# Patient Record
Sex: MALE | Race: WHITE | NOT HISPANIC OR LATINO | Employment: FULL TIME | URBAN - METROPOLITAN AREA
[De-identification: names, ages, dates, MRNs, and addresses within clinical notes are randomized per-mention and may not be internally consistent; named-entity substitution may affect disease eponyms.]

---

## 2022-04-10 ENCOUNTER — HISTORICAL (OUTPATIENT)
Dept: ADMINISTRATIVE | Facility: HOSPITAL | Age: 42
End: 2022-04-10

## 2022-04-29 VITALS
DIASTOLIC BLOOD PRESSURE: 94 MMHG | BODY MASS INDEX: 21.77 KG/M2 | OXYGEN SATURATION: 99 % | WEIGHT: 164.25 LBS | SYSTOLIC BLOOD PRESSURE: 142 MMHG | HEIGHT: 73 IN

## 2023-05-24 ENCOUNTER — HOSPITAL ENCOUNTER (EMERGENCY)
Facility: HOSPITAL | Age: 43
Discharge: PSYCHIATRIC OR SUBSTANCE USE FACILITY | End: 2023-05-24
Attending: EMERGENCY MEDICINE
Payer: COMMERCIAL

## 2023-05-24 VITALS
BODY MASS INDEX: 23.19 KG/M2 | HEART RATE: 85 BPM | WEIGHT: 175 LBS | OXYGEN SATURATION: 97 % | RESPIRATION RATE: 20 BRPM | DIASTOLIC BLOOD PRESSURE: 83 MMHG | HEIGHT: 73 IN | SYSTOLIC BLOOD PRESSURE: 150 MMHG | TEMPERATURE: 99 F

## 2023-05-24 DIAGNOSIS — F10.920 ALCOHOLIC INTOXICATION WITHOUT COMPLICATION: ICD-10-CM

## 2023-05-24 DIAGNOSIS — F32.A DEPRESSION WITH SUICIDAL IDEATION: Primary | ICD-10-CM

## 2023-05-24 DIAGNOSIS — R45.851 DEPRESSION WITH SUICIDAL IDEATION: Primary | ICD-10-CM

## 2023-05-24 DIAGNOSIS — F29 PSYCHOSIS, UNSPECIFIED PSYCHOSIS TYPE: ICD-10-CM

## 2023-05-24 LAB
ALBUMIN SERPL-MCNC: 4.5 G/DL (ref 3.5–5)
ALBUMIN/GLOB SERPL: 1.7 RATIO (ref 1.1–2)
ALP SERPL-CCNC: 76 UNIT/L (ref 40–150)
ALT SERPL-CCNC: 21 UNIT/L (ref 0–55)
AMPHET UR QL SCN: POSITIVE
APAP SERPL-MCNC: <17.4 UG/ML (ref 17.4–30)
APPEARANCE UR: CLEAR
AST SERPL-CCNC: 23 UNIT/L (ref 5–34)
BACTERIA #/AREA URNS AUTO: NORMAL /HPF
BARBITURATE SCN PRESENT UR: NEGATIVE
BASOPHILS # BLD AUTO: 0.03 X10(3)/MCL
BASOPHILS NFR BLD AUTO: 0.4 %
BENZODIAZ UR QL SCN: NEGATIVE
BILIRUB UR QL STRIP.AUTO: NEGATIVE MG/DL
BILIRUBIN DIRECT+TOT PNL SERPL-MCNC: 0.4 MG/DL
BUN SERPL-MCNC: 9.1 MG/DL (ref 8.9–20.6)
CALCIUM SERPL-MCNC: 9 MG/DL (ref 8.4–10.2)
CANNABINOIDS UR QL SCN: NEGATIVE
CHLORIDE SERPL-SCNC: 107 MMOL/L (ref 98–107)
CO2 SERPL-SCNC: 24 MMOL/L (ref 22–29)
COCAINE UR QL SCN: NEGATIVE
COLOR UR: YELLOW
CREAT SERPL-MCNC: 1.13 MG/DL (ref 0.73–1.18)
EOSINOPHIL # BLD AUTO: 0.06 X10(3)/MCL (ref 0–0.9)
EOSINOPHIL NFR BLD AUTO: 0.7 %
ERYTHROCYTE [DISTWIDTH] IN BLOOD BY AUTOMATED COUNT: 11.9 % (ref 11.5–17)
ETHANOL SERPL-MCNC: 188 MG/DL
ETHANOL SERPL-MCNC: 57 MG/DL
FENTANYL UR QL SCN: NEGATIVE
GFR SERPLBLD CREATININE-BSD FMLA CKD-EPI: >60 MLS/MIN/1.73/M2
GLOBULIN SER-MCNC: 2.7 GM/DL (ref 2.4–3.5)
GLUCOSE SERPL-MCNC: 99 MG/DL (ref 74–100)
GLUCOSE UR QL STRIP.AUTO: NEGATIVE MG/DL
HCT VFR BLD AUTO: 43.6 % (ref 42–52)
HGB BLD-MCNC: 15.5 G/DL (ref 14–18)
IMM GRANULOCYTES # BLD AUTO: 0.01 X10(3)/MCL (ref 0–0.04)
IMM GRANULOCYTES NFR BLD AUTO: 0.1 %
KETONES UR QL STRIP.AUTO: ABNORMAL MG/DL
LEUKOCYTE ESTERASE UR QL STRIP.AUTO: NEGATIVE UNIT/L
LYMPHOCYTES # BLD AUTO: 2.26 X10(3)/MCL (ref 0.6–4.6)
LYMPHOCYTES NFR BLD AUTO: 27.4 %
MCH RBC QN AUTO: 33.1 PG (ref 27–31)
MCHC RBC AUTO-ENTMCNC: 35.6 G/DL (ref 33–36)
MCV RBC AUTO: 93.2 FL (ref 80–94)
MDMA UR QL SCN: NEGATIVE
MONOCYTES # BLD AUTO: 0.57 X10(3)/MCL (ref 0.1–1.3)
MONOCYTES NFR BLD AUTO: 6.9 %
NEUTROPHILS # BLD AUTO: 5.31 X10(3)/MCL (ref 2.1–9.2)
NEUTROPHILS NFR BLD AUTO: 64.5 %
NITRITE UR QL STRIP.AUTO: NEGATIVE
NRBC BLD AUTO-RTO: 0 %
OPIATES UR QL SCN: NEGATIVE
PCP UR QL: NEGATIVE
PH UR STRIP.AUTO: 6 [PH]
PH UR: 6 [PH] (ref 3–11)
PLATELET # BLD AUTO: 321 X10(3)/MCL (ref 130–400)
PMV BLD AUTO: 9.8 FL (ref 7.4–10.4)
POTASSIUM SERPL-SCNC: 3.5 MMOL/L (ref 3.5–5.1)
PROT SERPL-MCNC: 7.2 GM/DL (ref 6.4–8.3)
PROT UR QL STRIP.AUTO: ABNORMAL MG/DL
RBC # BLD AUTO: 4.68 X10(6)/MCL (ref 4.7–6.1)
RBC #/AREA URNS AUTO: <5 /HPF
RBC UR QL AUTO: NEGATIVE UNIT/L
SARS-COV-2 RDRP RESP QL NAA+PROBE: NEGATIVE
SODIUM SERPL-SCNC: 143 MMOL/L (ref 136–145)
SP GR UR STRIP.AUTO: 1.02 (ref 1–1.03)
SPECIFIC GRAVITY, URINE AUTO (.000) (OHS): 1.02 (ref 1–1.03)
SQUAMOUS #/AREA URNS AUTO: <5 /HPF
TSH SERPL-ACNC: 1.59 UIU/ML (ref 0.35–4.94)
UROBILINOGEN UR STRIP-ACNC: 1 MG/DL
WBC # SPEC AUTO: 8.24 X10(3)/MCL (ref 4.5–11.5)
WBC #/AREA URNS AUTO: <5 /HPF

## 2023-05-24 PROCEDURE — 80307 DRUG TEST PRSMV CHEM ANLYZR: CPT | Performed by: EMERGENCY MEDICINE

## 2023-05-24 PROCEDURE — 81001 URINALYSIS AUTO W/SCOPE: CPT | Mod: 59 | Performed by: EMERGENCY MEDICINE

## 2023-05-24 PROCEDURE — 80143 DRUG ASSAY ACETAMINOPHEN: CPT | Performed by: EMERGENCY MEDICINE

## 2023-05-24 PROCEDURE — 25000003 PHARM REV CODE 250: Performed by: EMERGENCY MEDICINE

## 2023-05-24 PROCEDURE — 85025 COMPLETE CBC W/AUTO DIFF WBC: CPT | Performed by: EMERGENCY MEDICINE

## 2023-05-24 PROCEDURE — 87635 SARS-COV-2 COVID-19 AMP PRB: CPT | Performed by: EMERGENCY MEDICINE

## 2023-05-24 PROCEDURE — 84443 ASSAY THYROID STIM HORMONE: CPT | Performed by: EMERGENCY MEDICINE

## 2023-05-24 PROCEDURE — 80053 COMPREHEN METABOLIC PANEL: CPT | Performed by: EMERGENCY MEDICINE

## 2023-05-24 PROCEDURE — 63600175 PHARM REV CODE 636 W HCPCS: Performed by: EMERGENCY MEDICINE

## 2023-05-24 PROCEDURE — 82077 ASSAY SPEC XCP UR&BREATH IA: CPT | Performed by: EMERGENCY MEDICINE

## 2023-05-24 PROCEDURE — 96372 THER/PROPH/DIAG INJ SC/IM: CPT | Performed by: EMERGENCY MEDICINE

## 2023-05-24 PROCEDURE — 99285 EMERGENCY DEPT VISIT HI MDM: CPT

## 2023-05-24 RX ORDER — DIPHENHYDRAMINE HYDROCHLORIDE 50 MG/ML
50 INJECTION INTRAMUSCULAR; INTRAVENOUS EVERY 4 HOURS PRN
Status: DISCONTINUED | OUTPATIENT
Start: 2023-05-24 | End: 2023-05-25 | Stop reason: HOSPADM

## 2023-05-24 RX ORDER — LORAZEPAM 1 MG/1
2 TABLET ORAL EVERY 8 HOURS PRN
Status: DISCONTINUED | OUTPATIENT
Start: 2023-05-24 | End: 2023-05-25 | Stop reason: HOSPADM

## 2023-05-24 RX ORDER — IBUPROFEN 200 MG
1 TABLET ORAL DAILY
Status: DISCONTINUED | OUTPATIENT
Start: 2023-05-24 | End: 2023-05-25 | Stop reason: HOSPADM

## 2023-05-24 RX ORDER — HALOPERIDOL 5 MG/ML
5 INJECTION INTRAMUSCULAR EVERY 4 HOURS PRN
Status: DISCONTINUED | OUTPATIENT
Start: 2023-05-24 | End: 2023-05-25 | Stop reason: HOSPADM

## 2023-05-24 NOTE — ED PROVIDER NOTES
"Encounter Date: 5/24/2023    SCRIBE #1 NOTE: I, Ivone Szymanski, am scribing for, and in the presence of,  Esteban Madrigal MD. I have scribed the following portions of the note - Other sections scribed: HPI, ROS, PE.     History     Chief Complaint   Patient presents with    Psychiatric Evaluation     Pt. Arrives via PD s/t OPC. Pt. Refusing to answer questions. OPC states pt. "Threatened to blow his brains out." +etoh.      43 year old male w/ hx of HTN presents to ED via police under OPC by fiance for psychiatric evaluation. Per OPC, pt was threatening to blow his brains out and has been hearing voices talking about his father passing away. Pt denies any suicidal ideations or auditory hallucinations, states that his fiancee and him were arguing and that's while she filed the OPC. 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 He denies any drug use.     He is PECed at 1215.     The history is provided by the patient and the spouse. No  was used.   Review of patient's allergies indicates:  No Known Allergies  History reviewed. No pertinent past medical history.  History reviewed. No pertinent surgical history.  History reviewed. No pertinent family history.     Review of Systems   Constitutional:  Negative for fever.   Respiratory:  Negative for shortness of breath.    Cardiovascular:  Negative for chest pain.   Psychiatric/Behavioral:  Negative for hallucinations and suicidal ideas.      Physical Exam     Initial Vitals [05/24/23 1204]   BP Pulse Resp Temp SpO2   (!) 135/100 (!) 120 20 98.4 °F (36.9 °C) 98 %      MAP       --         Physical Exam    Nursing note and vitals reviewed.  Constitutional: He appears well-developed and well-nourished.   HENT:   Head: Normocephalic and atraumatic.   Mouth/Throat: Oropharynx is clear and " moist.   Eyes: Pupils are equal, round, and reactive to light.   Neck: Neck supple.   Normal range of motion.  Cardiovascular:  Normal rate, regular rhythm, normal heart sounds and intact distal pulses.           Pulmonary/Chest: Breath sounds normal. No respiratory distress.   Abdominal: Abdomen is soft. Bowel sounds are normal. There is no abdominal tenderness. There is no rebound and no guarding.   Musculoskeletal:         General: No tenderness or edema. Normal range of motion.      Cervical back: Normal range of motion and neck supple.     Neurological: He is alert and oriented to person, place, and time. He has normal strength. No sensory deficit. GCS score is 15. GCS eye subscore is 4. GCS verbal subscore is 5. GCS motor subscore is 6.   Skin: Skin is warm and dry. Capillary refill takes less than 2 seconds.   Psychiatric:   Pt appears irritated, tense.        ED Course   Procedures  Labs Reviewed   URINALYSIS, REFLEX TO URINE CULTURE - Abnormal; Notable for the following components:       Result Value    Protein, UA 1+ (*)     Ketones, UA Trace (*)     All other components within normal limits   DRUG SCREEN, URINE (BEAKER) - Abnormal; Notable for the following components:    Amphetamines, Urine Positive (*)     All other components within normal limits    Narrative:     Cut off concentrations:    Amphetamines - 1000 ng/ml  Barbiturates - 200 ng/ml  Benzodiazepine - 200 ng/ml  Cannabinoids (THC) - 50 ng/ml  Cocaine - 300 ng/ml  Fentanyl - 1.0 ng/ml  MDMA - 500 ng/ml  Opiates - 300 ng/ml   Phencyclidine (PCP) - 25 ng/ml    Specimen submitted for drug analysis and tested for pH and specific gravity in order to evaluate sample integrity. Suspect tampering if specific gravity is <1.003 and/or pH is not within the range of 4.5 - 8.0  False negatives may result form substances such as bleach added to urine.  False positives may result for the presence of a substance with similar chemical structure to the drug or  its metabolite.    This test provides only a PRELIMINARY analytical test result. A more specific alternate chemical method must be used in order to obtain a confirmed analytical result. Gas chromatography/mass spectrometry (GC/MS) is the preferred confirmatory method. Other chemical confirmation methods are available. Clinical consideration and professional judgement should be applied to any drug of abuse test result, particularly when preliminary positive results are used.    Positive results will be confirmed only at the physicians request. Unconfirmed screening results are to be used only for medical purposes (treatment).        ALCOHOL,MEDICAL (ETHANOL) - Abnormal; Notable for the following components:    Ethanol Level 188.0 (*)     All other components within normal limits   ACETAMINOPHEN LEVEL - Abnormal; Notable for the following components:    Acetaminophen Level <17.4 (*)     All other components within normal limits   CBC WITH DIFFERENTIAL - Abnormal; Notable for the following components:    RBC 4.68 (*)     MCH 33.1 (*)     All other components within normal limits   TSH - Normal   SARS-COV-2 RNA AMPLIFICATION, QUAL - Normal    Narrative:     The IDNOW COVID-19 assay is a rapid molecular in vitro diagnostic test utilizing an isothermal nucleic acid amplification technology intended for the qualitative detection of nucleic acid from the SARS-CoV-2 viral RNA in direct nasal, nasopharyngeal or throat swabs from individuals who are suspected of COVID-19 by their healthcare provider.   URINALYSIS, MICROSCOPIC - Normal   CBC W/ AUTO DIFFERENTIAL    Narrative:     The following orders were created for panel order CBC auto differential.  Procedure                               Abnormality         Status                     ---------                               -----------         ------                     CBC with Differential[[id number]]        Abnormal            Final result                 Please view  results for these tests on the individual orders.   COMPREHENSIVE METABOLIC PANEL   SARS CORONAVIRUS 2 ANTIGEN POCT, MANUAL READ          Imaging Results    None          Medications   LORazepam tablet 2 mg ( Oral Canceled Entry 5/24/23 1300)   haloperidol lactate injection 5 mg (5 mg Intramuscular Not Given 5/24/23 1300)   diphenhydrAMINE injection 50 mg ( Intramuscular Canceled Entry 5/24/23 1421)   nicotine 21 mg/24 hr 1 patch (has no administration in time range)     Medical Decision Making  Differential diagnosis include, but are not limited to: suicidal ideation, depression, schizoaffective disorder, bipolar disorder, substance abuse, alcohol abuse, psychosis.     Amount and/or Complexity of Data Reviewed  Independent Historian: spouse     Details: Per OPC, pt was threatening to blow his brains out and has been hearing voices talking about his father passing away.  Labs: ordered.     Details: Urinalysis positive for amphetamines   Alcohol 188    Risk  Decision regarding hospitalization.  Diagnosis or treatment significantly limited by social determinants of health.  Risk Details: Patient with mental health along with alcohol use issues  Patient will be transferred for evaluation by mental health professional for possible new onset hallucinations along with depression and suicidal ideations                 Scribe Attestation:   Scribe #1: I performed the above scribed service and the documentation accurately describes the services I performed. I attest to the accuracy of the note.    Attending Attestation:           Physician Attestation for Scribe:  Physician Attestation Statement for Scribe #1: I, Esteban Madrigal MD, reviewed documentation, as scribed by Ivone Szymanski in my presence, and it is both accurate and complete.           ED Course as of 05/24/23 1520   Wed May 24, 2023   1518 Patient with elevated alcohol level, will repeat at 6:00 p.m. [MP]      ED Course User Index  [MP] Esteban Madrigal MD        Medically cleared for psychiatry placement: 5/24/2023  3:19 PM         Clinical Impression:   Final diagnoses:  [F32.A, R45.851] Depression with suicidal ideation (Primary)  [F29] Psychosis, unspecified psychosis type  [F10.920] Alcoholic intoxication without complication        ED Disposition Condition    Transfer to Psych Facility Stable          ED Prescriptions    None       Follow-up Information    None          Esteban Madrigal MD  05/24/23 3731

## 2023-05-25 ENCOUNTER — HOSPITAL ENCOUNTER (INPATIENT)
Facility: HOSPITAL | Age: 43
LOS: 6 days | Discharge: HOME | DRG: 885 | End: 2023-05-31
Attending: PSYCHIATRY & NEUROLOGY | Admitting: PSYCHIATRY & NEUROLOGY
Payer: COMMERCIAL

## 2023-05-25 DIAGNOSIS — F33.3 MDD (MAJOR DEPRESSIVE DISORDER), RECURRENT, SEVERE, WITH PSYCHOSIS: ICD-10-CM

## 2023-05-25 PROBLEM — Z72.0 TOBACCO ABUSE: Status: ACTIVE | Noted: 2023-05-25

## 2023-05-25 LAB
CHOLEST SERPL-MCNC: 228 MG/DL
CHOLEST/HDLC SERPL: 4 {RATIO} (ref 0–5)
GLUCOSE P FAST SERPL-MCNC: 93 MG/DL (ref 74–100)
HDLC SERPL-MCNC: 63 MG/DL (ref 35–60)
LDLC SERPL CALC-MCNC: 147 MG/DL (ref 50–140)
T PALLIDUM AB SER QL: NONREACTIVE
TRIGL SERPL-MCNC: 91 MG/DL (ref 34–140)
VLDLC SERPL CALC-MCNC: 18 MG/DL

## 2023-05-25 PROCEDURE — S4991 NICOTINE PATCH NONLEGEND: HCPCS | Performed by: PSYCHIATRY & NEUROLOGY

## 2023-05-25 PROCEDURE — 86780 TREPONEMA PALLIDUM: CPT | Performed by: PSYCHIATRY & NEUROLOGY

## 2023-05-25 PROCEDURE — 25000003 PHARM REV CODE 250: Performed by: PSYCHIATRY & NEUROLOGY

## 2023-05-25 PROCEDURE — 82947 ASSAY GLUCOSE BLOOD QUANT: CPT | Performed by: PSYCHIATRY & NEUROLOGY

## 2023-05-25 PROCEDURE — 80061 LIPID PANEL: CPT | Performed by: PSYCHIATRY & NEUROLOGY

## 2023-05-25 PROCEDURE — 11400000 HC PSYCH PRIVATE ROOM

## 2023-05-25 RX ORDER — ACETAMINOPHEN 325 MG/1
650 TABLET ORAL EVERY 6 HOURS PRN
Status: DISCONTINUED | OUTPATIENT
Start: 2023-05-25 | End: 2023-05-31 | Stop reason: HOSPADM

## 2023-05-25 RX ORDER — HYDROXYZINE PAMOATE 25 MG/1
50 CAPSULE ORAL EVERY 6 HOURS PRN
Status: DISCONTINUED | OUTPATIENT
Start: 2023-05-25 | End: 2023-05-31 | Stop reason: HOSPADM

## 2023-05-25 RX ORDER — MAG HYDROX/ALUMINUM HYD/SIMETH 200-200-20
30 SUSPENSION, ORAL (FINAL DOSE FORM) ORAL EVERY 6 HOURS PRN
Status: DISCONTINUED | OUTPATIENT
Start: 2023-05-25 | End: 2023-05-31 | Stop reason: HOSPADM

## 2023-05-25 RX ORDER — DIPHENHYDRAMINE HCL 25 MG
25 CAPSULE ORAL EVERY 6 HOURS PRN
Status: DISCONTINUED | OUTPATIENT
Start: 2023-05-25 | End: 2023-05-31 | Stop reason: HOSPADM

## 2023-05-25 RX ORDER — IBUPROFEN 200 MG
1 TABLET ORAL DAILY
Status: DISCONTINUED | OUTPATIENT
Start: 2023-05-25 | End: 2023-05-31 | Stop reason: HOSPADM

## 2023-05-25 RX ORDER — MUPIROCIN 20 MG/G
OINTMENT TOPICAL 2 TIMES DAILY
Status: DISPENSED | OUTPATIENT
Start: 2023-05-25 | End: 2023-05-30

## 2023-05-25 RX ORDER — IBUPROFEN 400 MG/1
400 TABLET ORAL EVERY 6 HOURS PRN
Status: DISCONTINUED | OUTPATIENT
Start: 2023-05-25 | End: 2023-05-31 | Stop reason: HOSPADM

## 2023-05-25 RX ADMIN — DIPHENHYDRAMINE HYDROCHLORIDE 25 MG: 25 CAPSULE ORAL at 10:05

## 2023-05-25 RX ADMIN — NICOTINE 1 PATCH: 21 PATCH, EXTENDED RELEASE TRANSDERMAL at 08:05

## 2023-05-25 RX ADMIN — MUPIROCIN: 20 OINTMENT TOPICAL at 11:05

## 2023-05-25 RX ADMIN — MUPIROCIN: 20 OINTMENT TOPICAL at 09:05

## 2023-05-25 RX ADMIN — DIPHENHYDRAMINE HYDROCHLORIDE 25 MG: 25 CAPSULE ORAL at 08:05

## 2023-05-25 RX ADMIN — HYDROXYZINE PAMOATE 50 MG: 25 CAPSULE ORAL at 08:05

## 2023-05-25 NOTE — PROGRESS NOTES
Recreation Therapy Progress Note    Date: 5 25 2023     Time: 10:00 am     Group Title: creative expression     Mood: anxious and focused on DC     Behavior: cooperative and attended group with prompts and motivation     Affect: flat and low energy     Speech: normal    Cognition: alert but focused on leaving the hospital     Participation Level:  60% with prompts and motivation needed.     Intervention:cont to offer rt services.           Recreation Therapist   Signature: Abhishek HANEYS

## 2023-05-25 NOTE — ASSESSMENT & PLAN NOTE
Advise to stop smoking.  Can offer nicotine patch.  He is not interested in stopping(cessation=4mins)
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
## 2023-05-25 NOTE — HPI
42 yo male admitted to [facility] for suicidal ideation and auditory hallucinations.  He has no previous medical history.  He does drink Etoh and smokes tobacco.  He was threatening to blow his brains out.  Currently no N/V/D/C.  No fever/chills.  No chest pain/SOB.  Hospitalist have been consulted for medical evaluation.

## 2023-05-25 NOTE — PROGRESS NOTES
Recreation Therapy Assessment    1. MOOD: depressed  and anxious    2. BEHAVIOR:cooperative    3. AFFECT:anxious and focused  on being DC today he states.     4. COGNITION: alert    5. MOTOR BEHAVIOR/SKILLS: ambulatory    6. SPEECH:normal     7. LEISURE FUNCTIONING: music,fishing,sports,cooking , play pool  and visit with family    8. LEISURE NEEDS: to get back having fun with the guys...listen to music and exercise     9. 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 WHAT IS THE BEST THING THAT EVER HAPPENED TO YOU? My girlfriend    11. THINGS THAT FRUSTRATE AND ANGER ME ARE: nothing    12. WHEN I GET ANGRY, I USUALLY:  I yell pt states    13. MY RELATIONSHIP WITH MOST PEOPLE ARE: good    14. LEISURE INTERESTS/SKILLS: sports,music,exercise    15. LEISURE BARRIER:I be drinking     16. ASSESSMENT SUMMARY: pt is a  43 year old white male . Pt states he was drinking  and the girlfriend called the police and had him admitted. Pt states he is fine and ready to go home. Pt has 1 daughter that he does not get to see in another state. Pt states he has a good job. And lives with his girlfriend.  Pt drinks 6 pack a day he states.       17. TX PLAN FOR RECREATION THERAPY: pt will receive RT services 2 x a day and 5 x a week with Abhishek Ryan MA CTRS . Pt will be assisted to complete 3 to 4 assignments on non substance related leisure skills, interests and participation  to  substance abuse and enhance quality of life.  Pt will be assisted to complete 3 to 4 assignments  on problem solving skills and emotional outlets to enhance coping skills in daily living. Pt will utilize art,music,cognitive games and exercise to achieve these goals. Assist pt 1;1 as needed to achieve these goals.       18. SIGNATURE/CREDENTIALS:  Abhishek Ryan MA CTRS                                                                    DATE:  2023                             TIME:7;23 am         RECREATION THERAPIST  [facility]

## 2023-05-25 NOTE — GROUP NOTE
Group Psychotherapy       Group Focus: Communication Skills      Number of patients in attendance: 5    Group Start Time: 1730  Group End Time:  1815  Groups Date: 5/25/2023  Group Topic:  Behavioral Health  Group Department: [facility]  Group Facilitators:  Meseret Baker LPN  _____________________________________________________________________    Patient Name: Raymond Quinn  MRN: [id number]  Patient Class: IP- Psych   Admission Date\Time: 5/25/2023 12:05 AM  Hospital Length of Stay: 0  Primary Care Provider: Primary Doctor No     Referred by: Behavioral Medicine Unit Treatment Team     Target symptoms: Depression     Patient's response to treatment: Active Listening     Progress toward goals: Progressing slowly     Interval History: Participated with minimal interaction     Diagnosis: MDD     Plan: Continue treatment on BMU

## 2023-05-25 NOTE — PROGRESS NOTES
Recreation Therapy Progress Note    Date: 5 25 2023     Time: 1400    Group Title: leisure skills    Mood: anxious     Behavior: cooperative     Affect: less anxious this afternoon    Speech: normal    Cognition: alert but distracted with DC on today he states.     Participation Level: 50% in outdoor activity . Pt requested music activity.     Intervention:cont rt services          Recreation Therapist   Signature: Abhishek king MA CTRS

## 2023-05-25 NOTE — PLAN OF CARE
Problem: Adult Inpatient Plan of Care  Goal: Plan of Care Review  Outcome: Ongoing, Progressing  Goal: Patient-Specific Goal (Individualized)  Outcome: Ongoing, Progressing  Goal: Absence of Hospital-Acquired Illness or Injury  Outcome: Ongoing, Progressing  Goal: Optimal Comfort and Wellbeing  Outcome: Ongoing, Progressing  Goal: Readiness for Transition of Care  Outcome: Ongoing, Progressing     Problem: Behavior Regulation Impairment (Psychotic Signs/Symptoms)  Goal: Improved Behavioral Control (Psychotic Signs/Symptoms)  Outcome: Ongoing, Progressing     Problem: Cognitive Impairment (Psychotic Signs/Symptoms)  Goal: Optimal Cognitive Function (Psychotic Signs/Symptoms)  Outcome: Ongoing, Progressing     Problem: Decreased Participation and Engagement (Psychotic Signs/Symptoms)  Goal: Increased Participation and Engagement (Psychotic Signs/Symptoms)  Outcome: Ongoing, Progressing     Problem: Mood Impairment (Psychotic Signs/Symptoms)  Goal: Improved Mood Symptoms (Psychotic Signs/Symptoms)  Outcome: Ongoing, Progressing     Problem: Psychomotor Impairment (Psychotic Signs/Symptoms)  Goal: Improved Psychomotor Symptoms (Psychotic Signs/Symptoms)  Outcome: Ongoing, Progressing     Problem: Sensory Perception Impairment (Psychotic Signs/Symptoms)  Goal: Decreased Sensory Symptoms (Psychotic Signs/Symptoms)  Outcome: Ongoing, Progressing     Problem: Sleep Disturbance (Psychotic Signs/Symptoms)  Goal: Improved Sleep (Psychotic Signs/Symptoms)  Outcome: Ongoing, Progressing     Problem: Social, Occupational or Functional Impairment (Psychotic Signs/Symptoms)  Goal: Enhanced Social, Occupational or Functional Skills (Psychotic Signs/Symptoms)  Outcome: Ongoing, Progressing     Problem: Activity and Energy Impairment (Excessive Substance Use)  Goal: Optimized Energy Level (Excessive Substance Use)  Outcome: Ongoing, Progressing     Problem: Behavior Regulation Impairment (Excessive Substance Use)  Goal: Improved  Behavioral Control (Excessive Substance Use)  Outcome: Ongoing, Progressing     Problem: Decreased Participation and Engagement (Excessive Substance Use)  Goal: Increased Participation and Engagement (Excessive Substance Use)  Outcome: Ongoing, Progressing     Problem: Physiologic Impairment (Excessive Substance Use)  Goal: Improved Physiologic Symptoms (Excessive Substance Use)  Outcome: Ongoing, Progressing     Problem: Social, Occupational or Functional Impairment (Excessive Substance Use)  Goal: Enhanced Social, Occupational or Functional Skills (Excessive Substance Use)  Outcome: Ongoing, Progressing     Problem: Suicide Risk  Goal: Absence of Self-Harm  Outcome: Ongoing, Progressing     Problem: Adult Behavioral Health Plan of Care  Goal: Plan of Care Review  Outcome: Ongoing, Progressing  Goal: Patient-Specific Goal (Individualization)  Outcome: Ongoing, Progressing  Goal: Adheres to Safety Considerations for Self and Others  Outcome: Ongoing, Progressing  Goal: Absence of New-Onset Illness or Injury  Outcome: Ongoing, Progressing  Goal: Optimized Coping Skills in Response to Life Stressors  Outcome: Ongoing, Progressing  Goal: Develops/Participates in Therapeutic Geraldine to Support Successful Transition  Outcome: Ongoing, Progressing  Goal: Rounds/Family Conference  Outcome: Ongoing, Progressing     Problem: Depression  Goal: Improved Mood  Outcome: Ongoing, Progressing

## 2023-05-25 NOTE — SUBJECTIVE & OBJECTIVE
No past medical history on file.    No past surgical history on file.    Review of patient's allergies indicates:  No Known Allergies    Current Facility-Administered Medications on File Prior to Encounter   Medication    [DISCONTINUED] diphenhydrAMINE injection 50 mg    [DISCONTINUED] haloperidol lactate injection 5 mg    [DISCONTINUED] LORazepam tablet 2 mg    [DISCONTINUED] nicotine 21 mg/24 hr 1 patch     No current outpatient medications on file prior to encounter.     Family History    None       Tobacco Use    Smoking status: Not on file    Smokeless tobacco: Not on file   Substance and Sexual Activity    Alcohol use: Not on file    Drug use: Not on file    Sexual activity: Not on file     Review of Systems   Constitutional: Negative.    HENT: Negative.     Eyes: Negative.    Respiratory: Negative.     Cardiovascular: Negative.    Gastrointestinal: Negative.    Endocrine: Negative.    Genitourinary: Negative.    Musculoskeletal: Negative.    Skin: Negative.    Allergic/Immunologic: Negative.    Neurological: Negative.    Hematological: Negative.    Psychiatric/Behavioral:  Positive for suicidal ideas.    Objective:     Vital Signs (Most Recent):  Temp: 98.5 °F (36.9 °C) (05/25/23 0604)  Pulse: 89 (05/25/23 0604)  Resp: 18 (05/25/23 0604)  BP: 127/79 (05/25/23 0604)  SpO2: 97 % (05/25/23 0604) Vital Signs (24h Range):  Temp:  [97.7 °F (36.5 °C)-98.8 °F (37.1 °C)] 98.5 °F (36.9 °C)  Pulse:  [] 89  Resp:  [16-20] 18  SpO2:  [97 %-99 %] 97 %  BP: (123-150)/() 127/79     Weight: 74.8 kg (164 lb 14.5 oz)  Body mass index is 22.37 kg/m².     Physical Exam  Constitutional:       Appearance: Normal appearance. He is normal weight.   HENT:      Head: Normocephalic and atraumatic.      Nose: Nose normal.      Mouth/Throat:      Mouth: Mucous membranes are moist.      Pharynx: Oropharynx is clear.   Eyes:      Extraocular Movements: Extraocular movements intact and EOM normal.      Conjunctiva/sclera:  Conjunctivae normal.      Pupils: Pupils are equal, round, and reactive to light.   Cardiovascular:      Rate and Rhythm: Normal rate and regular rhythm.      Pulses: Normal pulses.      Heart sounds: Normal heart sounds.   Pulmonary:      Effort: Pulmonary effort is normal.      Breath sounds: Normal breath sounds.   Abdominal:      General: Bowel sounds are normal.      Palpations: Abdomen is soft.   Musculoskeletal:         General: Normal range of motion.      Cervical back: Normal range of motion and neck supple.   Skin:     General: Skin is warm and dry.      Capillary Refill: Capillary refill takes 2 to 3 seconds.   Neurological:      General: No focal deficit present.      Mental Status: He is alert and oriented to person, place, and time. Mental status is at baseline.   Psychiatric:         Mood and Affect: Mood normal.         Behavior: Behavior normal.         Thought Content: Thought content normal.         Judgment: Judgment normal.       CRANIAL NERVES     CN I  cranial nerve I not tested    CN II   Visual fields full to confrontation.     CN III, IV, VI   Pupils are equal, round, and reactive to light.  Extraocular motions are normal.   CN III: no CN III palsy  CN VI: no CN VI palsy    CN V   Facial sensation intact.     CN VII   Facial expression full, symmetric.     CN VIII   CN VIII normal.     CN IX, X   CN IX normal.   CN X normal.     CN XI   CN XI normal.     CN XII   CN XII normal.       Significant Labs: All pertinent labs within the past 24 hours have been reviewed.  BMP:   Recent Labs   Lab 05/24/23  1224      K 3.5   CO2 24   BUN 9.1   CREATININE 1.13   CALCIUM 9.0     CBC:   Recent Labs   Lab 05/24/23  1224   WBC 8.24   HGB 15.5   HCT 43.6        CMP:   Recent Labs   Lab 05/24/23  1224      K 3.5   CO2 24   BUN 9.1   CREATININE 1.13   CALCIUM 9.0   ALBUMIN 4.5   BILITOT 0.4   ALKPHOS 76   AST 23   ALT 21     Magnesium: No results for input(s): MG in the last 48  hours.    Significant Imaging: I have reviewed all pertinent imaging results/findings within the past 24 hours.

## 2023-05-25 NOTE — ED NOTES
"Called [facility] to give report; nurse stated "[facility] didn't send over the packet yet, we will call you back"   "

## 2023-05-25 NOTE — NURSING
Pt given Benadyl 25mg PO for c/o itching. Pt currently reporting feeling much better after taking this medication.

## 2023-05-25 NOTE — ASSESSMENT & PLAN NOTE
Patient has single episode of depression which is moderate and is currently uncontrolled. Will Begin anti-depressant medications. We will consult psychiatry at this time. Patient does not display psychosis at this time. Continue to monitor closely and adjust plan of care as needed.    Admit to [facility]  OOB  Ambulate  Review current meds

## 2023-05-25 NOTE — CONSULTS
[facility] Hospital Medicine  Consult Note    Patient Name: Raymond Quinn  MRN: [id number]  Admission Date: 2023  Hospital Length of Stay: 0 days  Attending Physician: José Bethea MD   Primary Care Provider: Primary Doctor No           Patient information was obtained from patient and ER records.     Consults  Subjective:     Principal Problem: MDD (major depressive disorder), recurrent, severe, with psychosis    Chief Complaint: No chief complaint on file.       HPI: 42 yo male admitted to [facility] for suicidal ideation and auditory hallucinations.  He has no previous medical history.  He does drink Etoh and smokes tobacco.  He was threatening to blow his brains out.  Currently no N/V/D/C.  No fever/chills.  No chest pain/SOB.  Hospitalist have been consulted for medical evaluation.      No past medical history on file.    Past surgical history on file:  2 inguinal hernias, hand surery    Review of patient's allergies indicates:  No Known Allergies    Current Facility-Administered Medications on File Prior to Encounter   Medication    [DISCONTINUED] diphenhydrAMINE injection 50 mg    [DISCONTINUED] haloperidol lactate injection 5 mg    [DISCONTINUED] LORazepam tablet 2 mg    [DISCONTINUED] nicotine 21 mg/24 hr 1 patch     No current outpatient medications on file prior to encounter.     Family History    Father  of CVA at 68 yo       Tobacco Use    Smoking status: Vaping, cigarettes    Smokeless tobacco: Not on file   Substance and Sexual Activity    Alcohol use: 6 beers daily    Drug use: Not on file    Sexual activity: Not on file     Review of Systems   Constitutional: Negative.    HENT: Negative.     Eyes: Negative.    Respiratory: Negative.     Cardiovascular: Negative.    Gastrointestinal: Negative.    Endocrine: Negative.    Genitourinary: Negative.    Musculoskeletal: Negative.    Skin: Negative.    Allergic/Immunologic: Negative.    Neurological:  Negative.    Hematological: Negative.    Psychiatric/Behavioral:  Positive for suicidal ideas.    Objective:     Vital Signs (Most Recent):  Temp: 98.5 °F (36.9 °C) (05/25/23 0604)  Pulse: 89 (05/25/23 0604)  Resp: 18 (05/25/23 0604)  BP: 127/79 (05/25/23 0604)  SpO2: 97 % (05/25/23 0604) Vital Signs (24h Range):  Temp:  [97.7 °F (36.5 °C)-98.8 °F (37.1 °C)] 98.5 °F (36.9 °C)  Pulse:  [] 89  Resp:  [16-20] 18  SpO2:  [97 %-99 %] 97 %  BP: (123-150)/() 127/79     Weight: 74.8 kg (164 lb 14.5 oz)  Body mass index is 22.37 kg/m².     Physical Exam  Constitutional:       Appearance: Normal appearance. He is normal weight.   HENT:      Head: Normocephalic and atraumatic.      Nose: Nose normal.      Mouth/Throat:      Mouth: Mucous membranes are moist.      Pharynx: Oropharynx is clear.   Eyes:      Extraocular Movements: Extraocular movements intact and EOM normal.      Conjunctiva/sclera: Conjunctivae normal.      Pupils: Pupils are equal, round, and reactive to light.   Cardiovascular:      Rate and Rhythm: Normal rate and regular rhythm.      Pulses: Normal pulses.      Heart sounds: Normal heart sounds.   Pulmonary:      Effort: Pulmonary effort is normal.      Breath sounds: Normal breath sounds.   Abdominal:      General: Bowel sounds are normal.      Palpations: Abdomen is soft.   Musculoskeletal:         General: Normal range of motion.      Cervical back: Normal range of motion and neck supple.   Skin:     General: Skin is warm and dry.      Capillary Refill: Capillary refill takes 2 to 3 seconds.   Neurological:      General: No focal deficit present.      Mental Status: He is alert and oriented to person, place, and time. Mental status is at baseline.   Psychiatric:         Mood and Affect: Mood normal.         Behavior: Behavior normal.         Thought Content: Thought content normal.         Judgment: Judgment normal.       CRANIAL NERVES     CN I  cranial nerve I not tested    CN II   Visual  fields full to confrontation.     CN III, IV, VI   Pupils are equal, round, and reactive to light.  Extraocular motions are normal.   CN III: no CN III palsy  CN VI: no CN VI palsy    CN V   Facial sensation intact.     CN VII   Facial expression full, symmetric.     CN VIII   CN VIII normal.     CN IX, X   CN IX normal.   CN X normal.     CN XI   CN XI normal.     CN XII   CN XII normal.       Significant Labs: All pertinent labs within the past 24 hours have been reviewed.  BMP:   Recent Labs   Lab 05/24/23  1224      K 3.5   CO2 24   BUN 9.1   CREATININE 1.13   CALCIUM 9.0     CBC:   Recent Labs   Lab 05/24/23  1224   WBC 8.24   HGB 15.5   HCT 43.6        CMP:   Recent Labs   Lab 05/24/23  1224      K 3.5   CO2 24   BUN 9.1   CREATININE 1.13   CALCIUM 9.0   ALBUMIN 4.5   BILITOT 0.4   ALKPHOS 76   AST 23   ALT 21     Magnesium: No results for input(s): MG in the last 48 hours.    Significant Imaging: I have reviewed all pertinent imaging results/findings within the past 24 hours.    Assessment/Plan:     * MDD (major depressive disorder), recurrent, severe, with psychosis  Patient has single episode of depression which is moderate and is currently uncontrolled. Will Begin anti-depressant medications. We will consult psychiatry at this time. Patient does not display psychosis at this time. Continue to monitor closely and adjust plan of care as needed.    Admit to [facility]  OOB  Ambulate  Review current meds    Tobacco abuse  Advise to stop smoking.  Can offer nicotine patch.  He is not interested in stopping(cessation=4mins)        VTE Risk Mitigation (From admission, onward)    None              Thank you for your consult. I will sign off. Please contact us if you have any additional questions.    Narendra Rodriguez MD  Department of Hospital Medicine   [facility]

## 2023-05-25 NOTE — NURSING
"                                                                             Admission Note:     Raymond Quinn is a 43 y.o. male, : 1980, MRN: [id number], admitted on 2023 to [facility] ([facility]) for José Bethea MD with a diagnosis of Major Depressive Disorder recurrent severe with psychotic features . Patient admitted on a status of Physician Emergency Certificate (PEC). Raymond reports the following allergies: NKDA.     Patient demonstrated an affect that was flat.  Patient demonstrated mood during assessment that was calm and cooperative. Patient had an appearance that was well kept and clean. When asked why he presented to the ER, pt stated, "I don't know, my girlfriend brought me there. I'm ready to go home." His father recently passed away in 2023. He lives with girlfriend Chichi.      Patient denies having suicidal ideation. Patient denies having auditory or visual hallucinations. Pt states, " I have been to [place] in [place] a few years ago."      Raymond's height is 6 ft and weight is 74.8 kg. His temperature is 97.7°F (36.5 C). His blood pressure is 123/85 and his pulse is 78. His respirations are 16 and oxygen saturation is 97%.      Raymond's  last BM was noted on: 2023. Raymond presented to unit flat mood AAO X4, steady gait, and her speech was clear and fluent. Pt is well kept. Pt states he smokes 1 pack a day and drinks a 6 pack of beer per day, but denies any drug use (UDS positive for amphetamines).  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       Metal detector screening performed via security personnel. The result of the scan was negative. Head-to-toe physical assessment completed with the following findings: tattoos noted on right chest, (2) on left forearm, left upper arm, right upper arm, right lower back, left shoulder blade, left side of rib cage, right forearm.  A full skin assessment was performed. Tamar skin appeared dry. He has a rash on his " face that is dry, red, flaky, and he states it itches.   .   Raymond was oriented to unit, staff, peers, and room. Patient belongings/valuables stored in locked intake room cabinet and changes of clothing provided to patient. Tamar was placed on Q 15 min observations. Q15min safety checks
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
## 2023-05-25 NOTE — NURSING
"Daily Nursing Note:      Behavior:    Patient (Raymond Quinn is a 43 y.o. male, : 1980, MRN: [id number]) demonstrating an affect that was flat and anxious. Raymond demonstrating mood that is anxious. Raymond had an appearance that was clean. Raymond denies suicidal ideation. Raymond denies suicide plan. Raymond denies homicidal ideation. Raymond denies hallucinations. "I had a misunderstanding with my girlfriend". "I found out she works for the [employer]" Denies paranoia. Admits to using a small amount of meth a few days ago. Denies past SA, history of depression  was on antidepressant 15 years ago, "I didn't like the way it made me feel". Refusing to take medications.    Raymond's  height is 6' (1.829 m) and weight is 74.8 kg (164 lb 14.5 oz). His oral temperature is 98.4 °F (36.9 °C). His blood pressure is 139/88 and his pulse is 83. His respiration is 18 and oxygen saturation is 98%.       Intervention:    Encourage Raymond to perform self-hygiene, grooming, and changing of clothing. Monitor Raymond's behavior and program compliance. Monitor Raymond for suicidal ideation, homicidal ideation, sleep disturbance, and hallucinations. Encourage Raymond to eat all portions of meals and assess for meal preferences. Monitor Raymond for intake and output to ensure hydration. Notify the Physician/Physician Assistant/Advance Practice Registered Nurse (MD/PA/APRN) for any medication refusal and any change in patient condition.      Response:    Raymond verbalizes understand of unit process and procedures. Raymond is refusing medications.      Plan:     Continue to monitor per MD/PA/APRN orders; maintain patient safety.   "

## 2023-05-26 PROBLEM — F15.150: Status: ACTIVE | Noted: 2023-05-26

## 2023-05-26 PROCEDURE — S4991 NICOTINE PATCH NONLEGEND: HCPCS | Performed by: PSYCHIATRY & NEUROLOGY

## 2023-05-26 PROCEDURE — 25000003 PHARM REV CODE 250: Performed by: PSYCHIATRY & NEUROLOGY

## 2023-05-26 PROCEDURE — 11400000 HC PSYCH PRIVATE ROOM

## 2023-05-26 RX ADMIN — DIPHENHYDRAMINE HYDROCHLORIDE 25 MG: 25 CAPSULE ORAL at 08:05

## 2023-05-26 RX ADMIN — MUPIROCIN: 20 OINTMENT TOPICAL at 08:05

## 2023-05-26 RX ADMIN — HYDROXYZINE PAMOATE 50 MG: 25 CAPSULE ORAL at 08:05

## 2023-05-26 RX ADMIN — NICOTINE 1 PATCH: 21 PATCH, EXTENDED RELEASE TRANSDERMAL at 08:05

## 2023-05-26 NOTE — NURSING
"Administered Benadryl 25mg po per pt c/o "face itching"  Will monitor for effectiveness.    Q 15 min safety checks in progress.    "
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
## 2023-05-26 NOTE — PLAN OF CARE
Problem: Adult Inpatient Plan of Care  Goal: Plan of Care Review  Outcome: Ongoing, Progressing  Goal: Patient-Specific Goal (Individualized)  Outcome: Ongoing, Progressing  Goal: Absence of Hospital-Acquired Illness or Injury  Outcome: Ongoing, Progressing  Goal: Optimal Comfort and Wellbeing  Outcome: Ongoing, Progressing  Goal: Readiness for Transition of Care  Outcome: Ongoing, Progressing     Problem: Behavior Regulation Impairment (Psychotic Signs/Symptoms)  Goal: Improved Behavioral Control (Psychotic Signs/Symptoms)  Outcome: Ongoing, Progressing     Problem: Cognitive Impairment (Psychotic Signs/Symptoms)  Goal: Optimal Cognitive Function (Psychotic Signs/Symptoms)  Outcome: Ongoing, Progressing     Problem: Decreased Participation and Engagement (Psychotic Signs/Symptoms)  Goal: Increased Participation and Engagement (Psychotic Signs/Symptoms)  Outcome: Ongoing, Progressing     Problem: Mood Impairment (Psychotic Signs/Symptoms)  Goal: Improved Mood Symptoms (Psychotic Signs/Symptoms)  Outcome: Ongoing, Progressing     Problem: Psychomotor Impairment (Psychotic Signs/Symptoms)  Goal: Improved Psychomotor Symptoms (Psychotic Signs/Symptoms)  Outcome: Ongoing, Progressing     Problem: Sensory Perception Impairment (Psychotic Signs/Symptoms)  Goal: Decreased Sensory Symptoms (Psychotic Signs/Symptoms)  Outcome: Ongoing, Progressing     Problem: Sleep Disturbance (Psychotic Signs/Symptoms)  Goal: Improved Sleep (Psychotic Signs/Symptoms)  Outcome: Ongoing, Progressing     Problem: Social, Occupational or Functional Impairment (Psychotic Signs/Symptoms)  Goal: Enhanced Social, Occupational or Functional Skills (Psychotic Signs/Symptoms)  Outcome: Ongoing, Progressing     Problem: Activity and Energy Impairment (Excessive Substance Use)  Goal: Optimized Energy Level (Excessive Substance Use)  Outcome: Ongoing, Progressing     Problem: Behavior Regulation Impairment (Excessive Substance Use)  Goal: Improved  Behavioral Control (Excessive Substance Use)  Outcome: Ongoing, Progressing     Problem: Decreased Participation and Engagement (Excessive Substance Use)  Goal: Increased Participation and Engagement (Excessive Substance Use)  Outcome: Ongoing, Progressing     Problem: Physiologic Impairment (Excessive Substance Use)  Goal: Improved Physiologic Symptoms (Excessive Substance Use)  Outcome: Ongoing, Progressing     Problem: Social, Occupational or Functional Impairment (Excessive Substance Use)  Goal: Enhanced Social, Occupational or Functional Skills (Excessive Substance Use)  Outcome: Ongoing, Progressing     Problem: Suicide Risk  Goal: Absence of Self-Harm  Outcome: Ongoing, Progressing     Problem: Adult Behavioral Health Plan of Care  Goal: Plan of Care Review  Outcome: Ongoing, Progressing  Goal: Patient-Specific Goal (Individualization)  Outcome: Ongoing, Progressing  Goal: Adheres to Safety Considerations for Self and Others  Outcome: Ongoing, Progressing  Goal: Absence of New-Onset Illness or Injury  Outcome: Ongoing, Progressing  Goal: Optimized Coping Skills in Response to Life Stressors  Outcome: Ongoing, Progressing  Goal: Develops/Participates in Therapeutic [place] to Support Successful Transition  Outcome: Ongoing, Progressing  Goal: Rounds/Family Conference  Outcome: Ongoing, Progressing     Problem: Depression  Goal: Improved Mood  Outcome: Ongoing, Progressing     Problem: Violence Risk or Actual  Goal: Anger and Impulse Control  Outcome: Ongoing, Progressing

## 2023-05-26 NOTE — PROGRESS NOTES
"2023 10:37 AM   Name: Raymond Quinn   : 1980   MRN: [id number]   [facility] Progress Note     SUBJECTIVE:   Pt seen and chart reviewed, received update from staff. Pt is new to me, received clinical update from staff and reviewed notes by psych APRN. Briefly, pt admitted on PEC for SI and psychotic sx in the setting of etoh and methamphetamine abuse.     Per staff, pt called his girlfriend yesterday telling her he was being discharged and to come pick him up. Girlfriend proceeded to call staff here crying, saying pt is not well to be released and she is scared of him.     Pt guarded and superficial for interview, affect is anxious and somewhat irritable. Pt opens the interview by stating the NP told him he would be discharged yesterday or today, which is almost certainly an attempt at manipulation - no suggestion that this was the case per APRN's note or per staff's reports. Pt says there is absolutely nothing wrong with him and he needs to return to work. I re-visited the comments he made about his girlfriend being in the [employer], he replies "I would like to keep that confidential." Goes on to say that he knows she is in the [employer] for 100% fact because "the way her dad reacted when I told him." States he has "dotted the I's and crossed the t's" on this theory so it is essentially a fact in his mind. Reality testing very poor. Continues to minimize meth abuse issues, states only using infrequently thought suspect problem is more severe (collateral from girlfriend suggests this as well). He claims he was never suicidal and never wanted to hurt himself, voiced those threats as a means to manipulate his girlfriend in some way. He denies any AVH, but certainly would not disclose these even if they were present. He was offered a trial of medication for what I believe to be psychotic sx (likely meth induced), but he declines because he does not believe he has any issues currently.      Current Medications:   Scheduled " Meds:    mupirocin   Nasal BID    nicotine  1 patch Transdermal Daily      PRN Meds: acetaminophen, aluminum-magnesium hydroxide-simethicone, diphenhydrAMINE, hydrOXYzine pamoate, ibuprofen     Allergies:   Review of patient's allergies indicates:  No Known Allergies     OBJECTIVE:   Vitals   Vitals:    05/26/23 0608   BP: 126/85   Pulse: 84   Resp: 16   Temp: 97.8 °F (36.6 °C)        Mental Status Exam:  Appearance: appropriate, fair hygiene/grooming, casually dressed, and tattoos  Sensorium: alert  Orientation: oriented to person, place, and time  Behavior/Cooperation: guarded and suspicious  Movements/Motor Activity: No tremor or involuntary movements observed. No psychomotor retardation or agitation  Speech: normal tone, normal rate, normal pitch, normal volume  Affect: anxious, irritable  Mood: anxious, irritable  Thought Process:  fairly linear  Associations: no loosening of associations  Thought Content: denies SI/HI/plan/intent and +paranoia as noted in HPI  Thought Perceptions: denies AVH  Attention/Concentration: Normal  Memory: recent and remote grossly intact  Insight: poor  Judgment: poor    Recent Results (from the past 48 hour(s))   Comprehensive metabolic panel    Collection Time: 05/24/23 12:24 PM   Result Value Ref Range    Sodium Level 143 136 - 145 mmol/L    Potassium Level 3.5 3.5 - 5.1 mmol/L    Chloride 107 98 - 107 mmol/L    Carbon Dioxide 24 22 - 29 mmol/L    Glucose Level 99 74 - 100 mg/dL    Blood Urea Nitrogen 9.1 8.9 - 20.6 mg/dL    Creatinine 1.13 0.73 - 1.18 mg/dL    Calcium Level Total 9.0 8.4 - 10.2 mg/dL    Protein Total 7.2 6.4 - 8.3 gm/dL    Albumin Level 4.5 3.5 - 5.0 g/dL    Globulin 2.7 2.4 - 3.5 gm/dL    Albumin/Globulin Ratio 1.7 1.1 - 2.0 ratio    Bilirubin Total 0.4 <=1.5 mg/dL    Alkaline Phosphatase 76 40 - 150 unit/L    Alanine Aminotransferase 21 0 - 55 unit/L    Aspartate Aminotransferase 23 5 - 34 unit/L    eGFR >60 mls/min/1.73/m2   TSH    Collection Time: 05/24/23  12:24 PM   Result Value Ref Range    Thyroid Stimulating Hormone 1.591 0.350 - 4.940 uIU/mL   Ethanol    Collection Time: 05/24/23 12:24 PM   Result Value Ref Range    Ethanol Level 188.0 (H) <=10.0 mg/dL   Acetaminophen level    Collection Time: 05/24/23 12:24 PM   Result Value Ref Range    Acetaminophen Level <17.4 (L) 17.4 - 30.0 ug/ml   CBC with Differential    Collection Time: 05/24/23 12:24 PM   Result Value Ref Range    WBC 8.24 4.50 - 11.50 x10(3)/mcL    RBC 4.68 (L) 4.70 - 6.10 x10(6)/mcL    Hgb 15.5 14.0 - 18.0 g/dL    Hct 43.6 42.0 - 52.0 %    MCV 93.2 80.0 - 94.0 fL    MCH 33.1 (H) 27.0 - 31.0 pg    MCHC 35.6 33.0 - 36.0 g/dL    RDW 11.9 11.5 - 17.0 %    Platelet 321 130 - 400 x10(3)/mcL    MPV 9.8 7.4 - 10.4 fL    Neut % 64.5 %    Lymph % 27.4 %    Mono % 6.9 %    Eos % 0.7 %    Basophil % 0.4 %    Lymph # 2.26 0.6 - 4.6 x10(3)/mcL    Neut # 5.31 2.1 - 9.2 x10(3)/mcL    Mono # 0.57 0.1 - 1.3 x10(3)/mcL    Eos # 0.06 0 - 0.9 x10(3)/mcL    Baso # 0.03 <=0.2 x10(3)/mcL    IG# 0.01 0 - 0.04 x10(3)/mcL    IG% 0.1 %    NRBC% 0.0 %   Urinalysis, Reflex to Urine Culture    Collection Time: 05/24/23 12:45 PM    Specimen: Urine   Result Value Ref Range    Color, UA Yellow Yellow, Light-Yellow, Dark Yellow, Natalia, Straw    Appearance, UA Clear Clear    Specific Gravity, UA 1.020 1.005 - 1.030    pH, UA 6.0 5.0 - 8.5    Protein, UA 1+ (A) Negative mg/dL    Glucose, UA Negative Negative, Normal mg/dL    Ketones, UA Trace (A) Negative mg/dL    Blood, UA Negative Negative unit/L    Bilirubin, UA Negative Negative mg/dL    Urobilinogen, UA 1.0 0.2, 1.0, Normal mg/dL    Nitrites, UA Negative Negative    Leukocyte Esterase, UA Negative Negative unit/L   Drug Screen, Urine    Collection Time: 05/24/23 12:45 PM   Result Value Ref Range    Amphetamines, Urine Positive (A) Negative    Barbituates, Urine Negative Negative    Benzodiazepine, Urine Negative Negative    Cannabinoids, Urine Negative Negative    Cocaine, Urine  Negative Negative    Fentanyl, Urine Negative Negative    MDMA, Urine Negative Negative    Opiates, Urine Negative Negative    Phencyclidine, Urine Negative Negative    pH, Urine 6.0 3.0 - 11.0    Specific Gravity, Urine Auto 1.020 1.001 - 1.035   Urinalysis, Microscopic    Collection Time: 05/24/23 12:45 PM   Result Value Ref Range    RBC, UA <5 <=5 /HPF    WBC, UA <5 <=5 /HPF    Squamous Epithelial Cells, UA <5 <=5 /HPF    Bacteria, UA None Seen None Seen, Rare, Occasional /HPF   COVID-19 Rapid Screening    Collection Time: 05/24/23 12:46 PM   Result Value Ref Range    SARS COV-2 MOLECULAR Negative Negative   Ethanol    Collection Time: 05/24/23  6:22 PM   Result Value Ref Range    Ethanol Level 57.0 (H) <=10.0 mg/dL   Lipid panel    Collection Time: 05/25/23  4:17 AM   Result Value Ref Range    Cholesterol Total 228 (H) <=200 mg/dL    HDL Cholesterol 63 (H) 35 - 60 mg/dL    Triglyceride 91 34 - 140 mg/dL    Cholesterol/HDL Ratio 4 0 - 5    Very Low Density Lipoprotein 18     LDL Cholesterol 147.00 (H) 50.00 - 140.00 mg/dL   SYPHILIS ANTIBODY (WITH REFLEX RPR)    Collection Time: 05/25/23  4:17 AM   Result Value Ref Range    Syphilis Antibody Nonreactive Nonreactive, Equivocal   Glucose, Fasting    Collection Time: 05/25/23  4:17 AM   Result Value Ref Range    Glucose Fasting 93 74 - 100 mg/dL      No results found for: PHENYTOIN, PHENOBARB, VALPROATE, CBMZ      ASSESSMENT/PLAN:   Diagnosis:  Active Hospital Problems    Diagnosis  POA    *MDD (major depressive disorder), recurrent, severe, with psychosis [F33.3]  Unknown    Oth stimulant abuse w stim-induce psych disorder w delusions [F15.150]  Unknown    Tobacco abuse [Z72.0]  Yes      Resolved Hospital Problems   No resolved problems to display.       Plan:  - Pt continues to refuse any/all medications at this time  - Antisocial traits evident, likely contributing to his behavioral issues at home especially when combined with meth +/- etoh  - Will CTM in  controlled environment, continue with education and recommendation to trial medication    Expected Disposition Plan: Home      Billy Johnson MD  Psychiatry - [facility]  05/26/2023 10:56 AM

## 2023-05-26 NOTE — HOSPITAL COURSE
"The patient was admitted to the psychiatric unit and monitored for safety. The medications were reconciled. A history and physical was completed by the primary care doctor and medical issues were addressed. The patient was provided with individual and group therapy.     5/26/23-Pt guarded and superficial for interview, affect is anxious and somewhat irritable. Pt opens the interview by stating the NP told him he would be discharged yesterday or today, which is almost certainly an attempt at manipulation - no suggestion that this was the case per APRN's note or per staff's reports. Pt says there is absolutely nothing wrong with him and he needs to return to work. I re-visited the comments he made about his girlfriend being in the [employer], he replies "I would like to keep that confidential." Goes on to say that he knows she is in the [employer] for 100% fact because "the way her dad reacted when I told him." States he has "dotted the I's and crossed the t's" on this theory so it is essentially a fact in his mind. Reality testing very poor. Continues to minimize meth abuse issues, states only using infrequently thought suspect problem is more severe (collateral from girlfriend suggests this as well). He claims he was never suicidal and never wanted to hurt himself, voiced those threats as a means to manipulate his girlfriend in some way. He denies any AVH, but certainly would not disclose these even if they were present. He was offered a trial of medication for what I believe to be psychotic sx (likely meth induced), but he declines because he does not believe he has any issues currently.     5/28/23-Admitted for depression, SI, delusions. Sleeping and eating well. He has been refusing meds but did take Vistaril and Benadryl. Says the detergent causes a rash. He is stressed due to death of his father recently. "I found out some things about my finicke." He thinks she works for the [employer] but he still talks to her. He lives alone but " lives close to her. Denies si/hi today. Denies hallucinations.  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    5/30/23-Seen for treatment team today. He still only takes Vistaril and Benadryl. He has fixed delusions of girlfriend being in the [employer] and has paranoia but says he is not too worried about it now. He has been calm here. No agitation. No si/hi. No active hallucinations. Sleeping well. He is cooperative in interview overall. Sleeping well. He is likely at baseline and will plan for discharge tomorrow given he is refusing psych meds.

## 2023-05-26 NOTE — NURSING
"Daily Nursing Note:        Behavior:     Patient (Raymond Quinn is a 43 y.o. male, : 1980, MRN: [id number]) demonstrating an affect that was flat and anxious. Raymond demonstrating mood that is anxious. Raymond had an appearance that was clean. Raymond denies suicidal ideation. Raymond denies suicide plan. Raymond denies homicidal ideation. Raymond denies hallucinations. He called [facility] and stated, "You can come pick me up. I can go home."     Raymond's  height is 6' (1.829 m) and weight is 74.8 kg (164 lb 14.5 oz). His oral temperature is 98.4 °F (36.9 °C). His blood pressure is 139/88 and his pulse is 83. His respiration is 18 and oxygen saturation is 98%.         Intervention:     Encourage Raymond to perform self-hygiene, grooming, and changing of clothing. Monitor Raymond's behavior and program compliance. Monitor Raymond for suicidal ideation, homicidal ideation, sleep disturbance, and hallucinations. Encourage Raymond to eat all portions of meals and assess for meal preferences. Monitor Raymond for intake and output to ensure hydration. Notify the Physician/Physician Assistant/Advance Practice Registered Nurse (MD/PA/APRN) for any medication refusal and any change in patient condition.        Response:     Raymond verbalizes understand of unit process and procedures. Raymond is requesting A&D ointment for the rash on his face. He stated the benadryl is helping the rash.        Plan:      Continue to monitor per MD/PA/APRN orders; maintain patient safety.         "

## 2023-05-26 NOTE — NURSING
Pt requesting somethng for itchy face and sleep. Vistaril 50 mg given and benadryl 25 mg given po.

## 2023-05-26 NOTE — GROUP NOTE
Group Psychotherapy       Group Focus: Stress Management      Number of patients in attendance: 3    Group Start Time: 1500  Group End Time:  1545  Groups Date: 5/26/2023  Group Topic:  Behavioral Health  Group Department: [facility] - Behavioral Health Unit  Group Facilitators:  Meseret Baker LPN  _____________________________________________________________________    Patient Name: Raymond Quinn  MRN: [id number]  Patient Class: IP- Psych   Admission Date\Time: 5/25/2023 12:05 AM  Hospital Length of Stay: 1  Primary Care Provider: Primary Doctor No     Referred by: Behavioral Medicine Unit Treatment Team     Target symptoms: N/A     Patient's response to treatment: N/A     Progress toward goals: N/A     Interval History: Raymond did not attend     Diagnosis: MDD with psychosis     Plan: Continue treatment on BMU

## 2023-05-27 PROCEDURE — 11400000 HC PSYCH PRIVATE ROOM

## 2023-05-27 PROCEDURE — S4991 NICOTINE PATCH NONLEGEND: HCPCS | Performed by: PSYCHIATRY & NEUROLOGY

## 2023-05-27 PROCEDURE — 25000003 PHARM REV CODE 250: Performed by: PSYCHIATRY & NEUROLOGY

## 2023-05-27 RX ADMIN — MUPIROCIN: 20 OINTMENT TOPICAL at 08:05

## 2023-05-27 RX ADMIN — NICOTINE 1 PATCH: 21 PATCH, EXTENDED RELEASE TRANSDERMAL at 08:05

## 2023-05-27 RX ADMIN — DIPHENHYDRAMINE HYDROCHLORIDE 25 MG: 25 CAPSULE ORAL at 08:05

## 2023-05-27 NOTE — GROUP NOTE
Group Psychotherapy       Group Focus: Education about Depression      Number of patients in attendance: 2    Group Start Time: 1000  Group End Time:  1045  Groups Date: 5/27/2023  Group Topic:  Behavioral Health  Group Department: [facility] - Behavioral Health Unit  Group Facilitators:  Aisha Ojeda RN  _____________________________________________________________________    Patient Name: Raymond Quinn  MRN: [id number]  Patient Class: IP- Psych   Admission Date\Time: 5/25/2023 12:05 AM  Hospital Length of Stay: 2  Primary Care Provider: Primary Doctor No     Referred by: Behavioral Medicine Unit Treatment Team     Target symptoms: Depression     Patient's response to treatment: Not Participating     Progress toward goals: Not progressing     Interval History: Pt did not attend     Diagnosis: MDD r/s with psychosis     Plan: Continue treatment on BMU

## 2023-05-27 NOTE — NURSING
Daily Nursing Note:      Behavior:    Patient (Raymond Quinn is a 43 y.o. male, : 1980, MRN: [id number]) demonstrating an affect that was anxious and irritable. Raymond demonstrating mood that is anxious and swings. Raymond had an appearance that was disheveled. Raymnod denies suicidal ideation. Raymond denies suicide plan. Raymond denies homicidal ideation. Raymond denies hallucinations.    Raymond's  height is 6' (1.829 m) and weight is 74.8 kg (164 lb 14.5 oz). His oral temperature is 98.4 °F (36.9 °C). His blood pressure is 140/84 (abnormal) and his pulse is 85. His respiration is 18 and oxygen saturation is 99%.     Raymond's last BM was noted on: _23______      Intervention:    Encourage Raymond to perform self-hygiene, grooming, and changing of clothing. Monitor Raymond's behavior and program compliance. Monitor Raymond for suicidal ideation, homicidal ideation, sleep disturbance, and hallucinations. Encourage Raymond to eat all portions of meals and assess for meal preferences. Monitor Raymond for intake and output to ensure hydration. Notify the Physician/Physician Assistant/Advance Practice Registered Nurse (MD/PA/APRN) for any medication refusal and any change in patient condition.      Response:    Raymond verbalizes understand of unit process and procedures. Raymond reported medications ______.      Plan:     Continue to monitor per MD/PA/APRN orders; maintain patient safety.

## 2023-05-27 NOTE — HPI
"Raymond Quinn is a 43 y.o. male placed under a PEC at [facility]. He was originally OPC'd by his fiancee for threatening suicide with a gun. Upon interview he stated that he and his girlfriend had gotten into an argument because "some things came to light that may have put a strain on our relationship". He was being vague and was difficulty to understand at first and then finally said, "ok I will whisper it-she doesn't sell jewelry out of her house like she said; she's an FBI agent". He then began speaking of what she could possibly be doing for them and what kind of evidence she was gathering-none of which made any sense. The fiancee also had reported that he had become bizarre and extremely paranoid over the last few weeks and that she wasn't sure why. She did verbalize also that Raymond's father passed away 2 and 1/2 weeks ago and that this may be contributing. She also stated that he verbalized that he was going to "blow his brains out". Today he denies everything. He stated that he has no recollection of saying that and that he "seriously doubts" that he would say something like that. He reports that he is not depressed over his father's passing because after two major strokes and "just lingering around on hospice" he knows that he is in a better place and no longer suffering. Of note his UDS was positive for methamphetamine. 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 The girlfriend reports that he drinks a 6 pack of beer a day. He minimized the methamphetamine use and stated "it was just a little bit and only once". His new symptoms of psychosis may simply be due to substance abuse. He does have a history of depression. Reports seeing someone when he was 15 years old (when his parents where ) and only took medications for 2 weeks. "I stopped all of that because it made me feel so weird". He was hospitalized once at [facility] several years ago but refused to take medications there. During " our interview he did scan the room often, attempted to  something on the floor that wasn't there and did appear to be paranoid. He adamantly refuses to take any medications.

## 2023-05-27 NOTE — PLAN OF CARE
Raymond has been isolating in his room most of the day.  Slept last night.  Appetite good.  Does not initiate conversation.  Takes his benadryl and gets up for meals and returns to his room.   Affect remains flat and he is guarded and continues to deny SI or any reason to be in the facility.    VS wnl.  No physical complaints verbalized except for dry skin.     Q 15 min checks continue for safety.

## 2023-05-27 NOTE — GROUP NOTE
Group Psychotherapy       Group Focus: Spirituality and Well-Being      Number of patients in attendance: 2    Group Start Time: 1600  Group End Time:  1645  Groups Date: 5/27/2023  Group Topic:  Behavioral Health  Group Department: [facility] - Behavioral Health Unit  Group Facilitators:  Meseret Baker LPN  _____________________________________________________________________    Patient Name: Raymond Quinn  MRN: [id number]  Patient Class: IP- Psych   Admission Date\Time: 5/25/2023 12:05 AM  Hospital Length of Stay: 2  Primary Care Provider: Primary Doctor No     Referred by: Behavioral Medicine Unit Treatment Team     Target symptoms: N/A     Patient's response to treatment: N/A     Progress toward goals: N/A     Interval History: Raymond did not attend     Diagnosis: MDD with psychosis     Plan: Continue treatment on BMU

## 2023-05-27 NOTE — NURSING
PRN Administration Note:    Behavior:    Patient (Raymond Quinn is a 43 y.o. male, : 1980, MRN: [id number])     Allergies: Patient has no known allergies.    Raymond's  height is 6' (1.829 m) and weight is 74.8 kg (164 lb 14.5 oz). His oral temperature is 98.4 °F (36.9 °C). His blood pressure is 140/84 (abnormal) and his pulse is 85. His respiration is 18 and oxygen saturation is 99%.     Reason for PRN Administration: itching and increase anxiety level.    Intervention:    Administered Benadryl 25mg PO and Vistaril 50mg PO per physician order to Raymond       Response:    Raymond tolerated administration well.      Plan:     Continue to monitor per MD/PA/APRN orders; and reevaluate medication effectiveness within 30 minutes.

## 2023-05-27 NOTE — NURSING
PRN Medication Follow-up Note:    Behavior:    Patient (Raymond Quinn is a 43 y.o. male, : 1980, MRN: [id number])     Allergies: Patient has no known allergies.    Raymond's  height is 6' (1.829 m) and weight is 74.8 kg (164 lb 14.5 oz). His oral temperature is 98.4 °F (36.9 °C). His blood pressure is 140/84 (abnormal) and his pulse is 85. His respiration is 18 and oxygen saturation is 99%.     Administered Benadryl 25mg PO and Vistaril 50mg PO per physician order to Raymond          Response:    Raymond's response: Patient in bed with eyes closed at this time. Appears to be sleeping. Respirations even and unlabored. No apparent distress noted.       Plan:     Continue to monitor per MD/PA/APRN orders; and reevaluate medication effectiveness within 30 minutes.

## 2023-05-28 PROCEDURE — 25000003 PHARM REV CODE 250: Performed by: PSYCHIATRY & NEUROLOGY

## 2023-05-28 PROCEDURE — 11400000 HC PSYCH PRIVATE ROOM

## 2023-05-28 PROCEDURE — S4991 NICOTINE PATCH NONLEGEND: HCPCS | Performed by: PSYCHIATRY & NEUROLOGY

## 2023-05-28 RX ADMIN — HYDROXYZINE PAMOATE 50 MG: 25 CAPSULE ORAL at 05:05

## 2023-05-28 RX ADMIN — NICOTINE 1 PATCH: 21 PATCH, EXTENDED RELEASE TRANSDERMAL at 08:05

## 2023-05-28 RX ADMIN — DIPHENHYDRAMINE HYDROCHLORIDE 25 MG: 25 CAPSULE ORAL at 08:05

## 2023-05-28 RX ADMIN — MUPIROCIN: 20 OINTMENT TOPICAL at 08:05

## 2023-05-28 RX ADMIN — DIPHENHYDRAMINE HYDROCHLORIDE 25 MG: 25 CAPSULE ORAL at 05:05

## 2023-05-28 NOTE — NURSING
"Benadryl 50mg po given per Pt c/o "itching"  Will monitor for effectiveness.  Q 15 min safety checks in progress.  "

## 2023-05-28 NOTE — PROGRESS NOTES
"[facility]  Psychiatry  Progress Note    Patient Name: Raymond Quinn  MRN: [id number]   Code Status: Full Code  Admission Date: 5/25/2023  Hospital Length of Stay: 3 days  Expected Discharge Date:   Attending Physician: José Bethea MD  Primary Care Provider: Primary Doctor No    Current Legal Status: PEC    Patient information was obtained from patient and ER records.       Subjective:     Patient is a 43 y.o., male, presents with:    Principal Problem:MDD (major depressive disorder), recurrent, severe, with psychosis    Chief Complaint: alright    HPI: Raymond Quinn is a 43 y.o. male placed under a PEC at [facility]. He was originally OPC'd by his fiancee for threatening suicide with a gun. Upon interview he stated that he and his girlfriend had gotten into an argument because "some things came to light that may have put a strain on our relationship". He was being vague and was difficulty to understand at first and then finally said, "ok I will whisper it-she doesn't sell jewelry out of her house like she said; she's an [employer] agent". He then began speaking of what she could possibly be doing for them and what kind of evidence she was gathering-none of which made any sense. The fiancee also had reported that he had become bizarre and extremely paranoid over the last few weeks and that she wasn't sure why. She did verbalize also that Raymond's father passed away 2 and 1/2 weeks ago and that this may be contributing. She also stated that he verbalized that he was going to "blow his brains out". Today he denies everything. He stated that he has no recollection of saying that and that he "seriously doubts" that he would say something like that. He reports that he is not depressed over his father's passing because after two major strokes and "just lingering around on hospice" he knows that he is in a better place and no longer suffering. Of note his UDS was positive for methamphetamine. 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 The girlfriend reports that he drinks a 6 pack of beer a day. He minimized the methamphetamine use and stated "it was just a little bit and only once". His new symptoms of psychosis may simply be due to substance abuse. He does have a history of depression. Reports seeing someone when he was 15 years old (when his parents where ) and only took medications for 2 weeks. "I stopped all of that because it made me feel so weird". He was hospitalized once at [facility] several years ago but refused to take medications there. During our interview he did scan the room often, attempted to  something on the floor that wasn't there and did appear to be paranoid. He adamantly refuses to take any medications.       Hospital Course: The patient was admitted to the psychiatric unit and monitored for safety. The medications were reconciled. A history and physical was completed by the primary care doctor and medical issues were addressed. The patient was provided with individual and group therapy.     5/26/23-Pt guarded and superficial for interview, affect is anxious and somewhat irritable. Pt opens the interview by stating the NP told him he would be discharged yesterday or today, which is almost certainly an attempt at manipulation - no suggestion that this was the case per APRN's note or per staff's reports. Pt says there is absolutely nothing wrong with him and he needs to return to work. I re-visited the comments he made about his girlfriend being in the [employer], he replies "I would like to keep that confidential." Goes on to say that he knows she is in the [employer] for 100% fact because "the way her dad reacted when I told him." States he has "dotted the I's and crossed the t's" on this theory so it is essentially a fact in his mind. Reality testing very poor. Continues to minimize meth abuse issues, states only using infrequently thought suspect problem is " "more severe (collateral from girlfriend suggests this as well). He claims he was never suicidal and never wanted to hurt himself, voiced those threats as a means to manipulate his girlfriend in some way. He denies any AVH, but certainly would not disclose these even if they were present. He was offered a trial of medication for what I believe to be psychotic sx (likely meth induced), but he declines because he does not believe he has any issues currently.     5/28/23-Admitted for depression, SI, delusions. Sleeping and eating well. He has been refusing meds but did take Vistaril and Benadryl. Says the detergent causes a rash. He is stressed due to death of his father recently. "I found out some things about my finicke." He thinks she works for the [employer] but he still talks to her. He lives alone but lives close to her. Denies si/hi today. Denies hallucinations.  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      Patient was seen via video telemedicine and consented to the interview. The patient was located in [place], [place] and the provider was located in [place], [place].    Psychiatric Mental Status Exam:  General Appearance: appears stated age, well-developed, well-nourished  Arousal: alert  Behavior: cooperative  Movements and Motor Activity: no abnormal involuntary movements noted  Orientation: oriented to person, place, time, and situation  Speech: normal rate, normal rhythm, normal volume, normal tone  Mood: anxious  Affect: mood-congruent  Thought Process: linear  Associations: intact  Thought Content and Perceptions: no suicidal ideation, no homicidal ideation, no auditory hallucinations, no visual hallucinations. He does have paranoid delusions.  Recent and Remote Memory: recent memory intact, remote memory intact; per interview/observation with patient  Attention and Concentration: intact, attentive to conversation; per " interview/observation with patient  Fund of Knowledge: intact, aware of current events, vocabulary appropriate; based on history, vocabulary, fund of knowledge, syntax, grammar, and content  Insight: limited; based on understanding of severity of illness and HPI  Judgment: limited; based on patient's behavior and HPI      Family History    None       Tobacco Use    Smoking status: Not on file    Smokeless tobacco: Not on file   Substance and Sexual Activity    Alcohol use: Not on file    Drug use: Not on file    Sexual activity: Not on file     Psychotherapeutics (From admission, onward)      None             Review of Systems  Objective:     Vital Signs (Most Recent):  Temp: 97.9 °F (36.6 °C) (05/28/23 0610)  Pulse: 80 (05/28/23 0610)  Resp: 18 (05/28/23 0610)  BP: 126/88 (05/28/23 0610)  SpO2: 99 % (05/28/23 0610) Vital Signs (24h Range):  Temp:  [97.9 °F (36.6 °C)-98.1 °F (36.7 °C)] 97.9 °F (36.6 °C)  Pulse:  [80-84] 80  Resp:  [18] 18  SpO2:  [99 %] 99 %  BP: (119-126)/(82-88) 126/88     Height: 6' (182.9 cm)  Weight: 77.8 kg (171 lb 8.3 oz)  Body mass index is 23.26 kg/m².    No intake or output data in the 24 hours ending 05/28/23 1431     Physical Exam        Significant Labs: Last 72 Hours:   Recent Lab Results       None            Significant Imaging: I have reviewed all pertinent imaging results/findings within the past 24 hours.       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         Need for Continued Hospitalization:  Protective inpatient  psychiatric hospitalization required while a safe disposition plan is enacted.    Anticipated Disposition:  Home or Self Care      José Bethea MD   Psychiatry  [facility]

## 2023-05-28 NOTE — GROUP NOTE
Group Education      Group Focus: Expectations of Treatment/General Health Education      Number of patients in attendance: 6    Group Start Time: 0930  Group End Time:  1015  Groups Date: 5/28/2023  Group Topic:  Behavioral Health  Group Department: [facility]  Group Facilitators:  Aisha Ojeda RN  _____________________________________________________________________    Patient Name: Raymond Quinn  MRN: [id number]  Patient Class: IP- Psych   Admission Date\Time: 5/25/2023 12:05 AM  Hospital Length of Stay: 3  Primary Care Provider: Primary Doctor No     Referred by: Behavioral Medicine Unit Treatment Team     Target symptoms: Mood Disorder     Patient's response to treatment: Active Listening     Progress toward goals: Not progressing     Interval History: No feedback but did remain attentive.      Diagnosis: MDD     Plan: Continue treatment on BMU

## 2023-05-28 NOTE — NURSING
"PRN Administration Note:    Behavior:    Patient (Raymond Quinn is a 43 y.o. male, : 1980, MRN: [id number])     Allergies: Patient has no known allergies.    Raymond's  height is 6' (1.829 m) and weight is 77.8 kg (171 lb 8.3 oz). His oral temperature is 97.9 °F (36.6 °C). His blood pressure is 126/88 and his pulse is 80. His respiration is 18 and oxygen saturation is 99%.     Reason for PRN Administration: "Itching and anxiety."    Intervention:    Administered Benadryl 25mg and Vistaril 50mg po per physician order to Raymond       Response:    Raymond tolerated administration well.      Plan:     Continue to monitor per MD/PA/APRN orders; and reevaluate medication effectiveness within 30 minutes.   "

## 2023-05-28 NOTE — SUBJECTIVE & OBJECTIVE
Patient was seen via video telemedicine and consented to the interview. The patient was located in [place], [place] and the provider was located in [place], [place].    Psychiatric Mental Status Exam:  General Appearance: appears stated age, well-developed, well-nourished  Arousal: alert  Behavior: cooperative  Movements and Motor Activity: no abnormal involuntary movements noted  Orientation: oriented to person, place, time, and situation  Speech: normal rate, normal rhythm, normal volume, normal tone  Mood: anxious  Affect: mood-congruent  Thought Process: linear  Associations: intact  Thought Content and Perceptions: no suicidal ideation, no homicidal ideation, no auditory hallucinations, no visual hallucinations. He does have paranoid delusions.  Recent and Remote Memory: recent memory intact, remote memory intact; per interview/observation with patient  Attention and Concentration: intact, attentive to conversation; per interview/observation with patient  Fund of Knowledge: intact, aware of current events, vocabulary appropriate; based on history, vocabulary, fund of knowledge, syntax, grammar, and content  Insight: limited; based on understanding of severity of illness and HPI  Judgment: limited; based on patient's behavior and HPI      Family History    None       Tobacco Use    Smoking status: Not on file    Smokeless tobacco: Not on file   Substance and Sexual Activity    Alcohol use: Not on file    Drug use: Not on file    Sexual activity: Not on file     Psychotherapeutics (From admission, onward)      None             Review of Systems  Objective:     Vital Signs (Most Recent):  Temp: 97.9 °F (36.6 °C) (05/28/23 0610)  Pulse: 80 (05/28/23 0610)  Resp: 18 (05/28/23 0610)  BP: 126/88 (05/28/23 0610)  SpO2: 99 % (05/28/23 0610) Vital Signs (24h Range):  Temp:  [97.9 °F (36.6 °C)-98.1 °F (36.7 °C)] 97.9 °F (36.6 °C)  Pulse:  [80-84] 80  Resp:  [18] 18  SpO2:  [99 %] 99 %  BP: (119-126)/(82-88) 126/88     Height: 6'  (182.9 cm)  Weight: 77.8 kg (171 lb 8.3 oz)  Body mass index is 23.26 kg/m².    No intake or output data in the 24 hours ending 05/28/23 1431     Physical Exam        Significant Labs: Last 72 Hours:   Recent Lab Results       None            Significant Imaging: I have reviewed all pertinent imaging results/findings within the past 24 hours.

## 2023-05-28 NOTE — ASSESSMENT & PLAN NOTE
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

## 2023-05-28 NOTE — NURSING
"Raymond has been cooperative, attended group this morning.  He offers no disclosure.   Met with Dr. Bethea today and reiterated that his fiance' works for the [employer] but "I'm dealing with it and I don' tneed any medicine for it".    He stated that he slept well last night.  Appetite good.  Remains withdrawn and avoids social contact.  Eye contact is good.    He verbalized understanding that he will not be discharged until Dr. Bethea believes he is ready and safe to go home.     Denies SI, HI and AVH.  Denies feeling depressed but feels frustrated being here.  VS wnl.      Q 15 min checks continue for safety.  "

## 2023-05-29 PROCEDURE — S4991 NICOTINE PATCH NONLEGEND: HCPCS | Performed by: PSYCHIATRY & NEUROLOGY

## 2023-05-29 PROCEDURE — 25000003 PHARM REV CODE 250: Performed by: PSYCHIATRY & NEUROLOGY

## 2023-05-29 PROCEDURE — 11400000 HC PSYCH PRIVATE ROOM

## 2023-05-29 RX ADMIN — NICOTINE 1 PATCH: 21 PATCH, EXTENDED RELEASE TRANSDERMAL at 08:05

## 2023-05-29 RX ADMIN — DIPHENHYDRAMINE HYDROCHLORIDE 25 MG: 25 CAPSULE ORAL at 08:05

## 2023-05-29 RX ADMIN — MUPIROCIN: 20 OINTMENT TOPICAL at 08:05

## 2023-05-29 RX ADMIN — HYDROXYZINE PAMOATE 50 MG: 25 CAPSULE ORAL at 08:05

## 2023-05-29 NOTE — GROUP NOTE
Nursing Group      Group Focus: Depression, Anxiety and Stress Management     Number of patients in attendance: 2    Group Start Time: 1500  Group End Time:  1545  Groups Date: 5/29/2023  Group Topic:  Behavioral Health  Group Department: [facility]  Group Facilitators:  Siria Dewitt RN  _____________________________________________________________________    Patient Name: Raymond Quinn  MRN: [id number]  Patient Class: IP- Psych   Admission Date\Time: 5/25/2023 12:05 AM  Hospital Length of Stay: 4  Primary Care Provider: Primary Doctor No     Referred by: Behavioral Medicine Unit Treatment Team     Target symptoms: NA     Patient's response to treatment: NA     Progress toward goals: NA     Interval History: Pt did not attend group.     Diagnosis: MDD     Plan: Continue treatment on BMU

## 2023-05-29 NOTE — GROUP NOTE
Group Psychotherapy       Group Focus: Social Interaction      Number of patients in attendance: 3    Group Start Time: 1600  Group End Time:  1645  Groups Date: 5/29/2023  Group Topic:  Behavioral Health  Group Department: [facility]  Group Facilitators:  Meseret Baker LPN  _____________________________________________________________________    Patient Name: Raymond Quinn  MRN: [id number]  Patient Class: IP- Psych   Admission Date\Time: 5/25/2023 12:05 AM  Hospital Length of Stay: 4  Primary Care Provider: Primary Doctor No     Referred by: Behavioral Medicine Unit Treatment Team     Target symptoms: N/A     Patient's response to treatment: N/A     Progress toward goals: N/A     Interval History: Raymond did not attend     Diagnosis: MDD with psychosis     Plan: Continue treatment on BMU

## 2023-05-29 NOTE — NURSING
"PRN Administration Note:    Behavior:    Patient (Raymond Quinn is a 43 y.o. male, : 1980, MRN: [id number])     Allergies: Patient has no known allergies.    Raymond's  height is 6' (1.829 m) and weight is 77.8 kg (171 lb 8.3 oz). His oral temperature is 97.9 °F (36.6 °C). His blood pressure is 136/88 and his pulse is 80. His respiration is 18 and oxygen saturation is 100%.     Reason for PRN Administration: "itching."    Intervention:    Administered Benadryl 25mg po per physician order to Raymond       Response:    Raymond tolerated administration well.      Plan:     Continue to monitor per MD/PA/APRN orders; and reevaluate medication effectiveness within 30 minutes.   "

## 2023-05-29 NOTE — PROGRESS NOTES
Recreation Therapy Progress Note    Date: 5 29 2023     Time: 1400     Group Title: leisure skills    Mood: less depressed and interacted more today in outside cognitive game and music     Behavior: cooperative  to attend group with motivation needed.     Affect: flat ,quiet and low energy.     Speech: normal    Cognition: alert  and focused in cognitive game  with prompts from staff.     Participation Level: 95%  which is a improvement.     Intervention:cont rt services..           Recreation Therapist   Signature: Abhishek Ryan MA CTRS

## 2023-05-29 NOTE — NURSING
PRN Medication Follow-up Note:    Behavior:    Patient (Raymond Quinn is a 43 y.o. male, : 1980, MRN: [id number])     Allergies: Patient has no known allergies.    Raymond's  height is 6' (1.829 m) and weight is 77.8 kg (171 lb 8.3 oz). His oral temperature is 97.9 °F (36.6 °C). His blood pressure is 126/88 and his pulse is 80. His respiration is 18 and oxygen saturation is 99%.     Administered Benadryl 25mg PO and Vistaril 50mg PO per physician order to Raymond         Response:    Raymond's response: Patient in bed with eyes closed at this time; appears to be sleeping. His respirations are even and unlabored. No apparent distress noted.       Plan:     Continue to monitor per MD/PA/APRN orders; and reevaluate medication effectiveness within 30 minutes.

## 2023-05-29 NOTE — PROGRESS NOTES
Recreation Therapy Progress Note    Date: 5 29 2023     Time: 10:00 am     Group Title: creative expression     Mood: flat and low energy     Behavior: cooperative but isolated from the group after a few minutes in group.     Affect: flat and focused on dc     Speech: normal    Cognition: alert but distracted with DC     Participation Level: pt did a few minutes in group then requested to lay down.     Intervention:  Cont to offer rt groups. Assist 1;1 as needed.         Recreation Therapist   Signature: Abhishek king MA CTRS

## 2023-05-29 NOTE — NURSING
Daily Nursing Note:      Behavior:    Patient (Raymond Quinn is a 43 y.o. male, : 1980, MRN: [id number]) demonstrating an affect that was anxious. Raymond demonstrating mood that is anxious. Raymond had an appearance that was disheveled. Raymond denies suicidal ideation. Raymond denies suicide plan. Raymond denies homicidal ideation. Raymond denies hallucinations.    Raymond's  height is 6' (1.829 m) and weight is 77.8 kg (171 lb 8.3 oz). His oral temperature is 97.9 °F (36.6 °C). His blood pressure is 126/88 and his pulse is 80. His respiration is 18 and oxygen saturation is 99%.     Raymond's last BM was noted on: _23______      Intervention:    Encourage Raymond to perform self-hygiene, grooming, and changing of clothing. Monitor Raymond's behavior and program compliance. Monitor Raymond for suicidal ideation, homicidal ideation, sleep disturbance, and hallucinations. Encourage Raymond to eat all portions of meals and assess for meal preferences. Monitor Raymond for intake and output to ensure hydration. Notify the Physician/Physician Assistant/Advance Practice Registered Nurse (MD/PA/APRN) for any medication refusal and any change in patient condition.      Response:    Raymond verbalizes understand of unit process and procedures. Raymond reported medications ______.      Plan:     Continue to monitor per MD/PA/APRN orders; maintain patient safety.

## 2023-05-29 NOTE — NURSING
Daily Nursing Note:      Behavior:    Patient (Raymond Quinn is a 43 y.o. male, : 1980, MRN: [id number]) demonstrating an affect that was anxious. Raymond demonstrating mood that is anxious. Raymond had an appearance that was clean and good hygiene. Raymond denies suicidal ideation. Raymond denies suicide plan. Raymond denies homicidal ideation. Raymond denies hallucinations.    Raymond's  height is 6' (1.829 m) and weight is 77.8 kg (171 lb 8.3 oz). His oral temperature is 98.1 °F (36.7 °C). His blood pressure is 136/95 (abnormal) and his pulse is 86. His respiration is 16 and oxygen saturation is 99%.     Raymond's last BM was noted on: 23      Intervention:    Encourage Raymond to perform self-hygiene, grooming, and changing of clothing. Monitor Raymond's behavior and program compliance. Monitor Raymond for suicidal ideation, homicidal ideation, sleep disturbance, and hallucinations. Encourage Raymond to eat all portions of meals and assess for meal preferences. Monitor Raymond for intake and output to ensure hydration. Notify the Physician/Physician Assistant/Advance Practice Registered Nurse (MD/PA/APRN) for any medication refusal and any change in patient condition.      Response:    Raymond verbalizes understand of unit process and procedures. Raymond is refusing psychotropic medications.      Plan:     Continue to monitor per MD/PA/APRN orders; maintain patient safety.

## 2023-05-30 PROCEDURE — 11400000 HC PSYCH PRIVATE ROOM

## 2023-05-30 PROCEDURE — 25000003 PHARM REV CODE 250: Performed by: PSYCHIATRY & NEUROLOGY

## 2023-05-30 PROCEDURE — S4991 NICOTINE PATCH NONLEGEND: HCPCS | Performed by: PSYCHIATRY & NEUROLOGY

## 2023-05-30 RX ORDER — IBUPROFEN 200 MG
1 TABLET ORAL DAILY
Qty: 28 PATCH | Refills: 0 | Status: SHIPPED | OUTPATIENT
Start: 2023-05-31 | End: 2023-06-28

## 2023-05-30 RX ADMIN — NICOTINE 1 PATCH: 21 PATCH, EXTENDED RELEASE TRANSDERMAL at 08:05

## 2023-05-30 RX ADMIN — DIPHENHYDRAMINE HYDROCHLORIDE 25 MG: 25 CAPSULE ORAL at 08:05

## 2023-05-30 RX ADMIN — HYDROXYZINE PAMOATE 50 MG: 25 CAPSULE ORAL at 08:05

## 2023-05-30 NOTE — GROUP NOTE
Group Psychotherapy       Group Focus: ANXIETY, SYMPTOMS, TREATMENT      Number of patients in attendance: 4    Group Start Time: 2000  Group End Time:  2045  Groups Date: 5/29/2023  Group Topic:  Behavioral Health  Group Department: [facility]  Group Facilitators:  Umm Batista RN  _____________________________________________________________________    Patient Name: Raymond Quinn  MRN: [id number]  Patient Class: IP- Psych   Admission Date\Time: 5/25/2023 12:05 AM  Hospital Length of Stay: 5  Primary Care Provider: Primary Doctor No     Referred by: Behavioral Medicine Unit Treatment Team     Target symptoms: Anxiety     Patient's response to treatment: Not Participating     Progress toward goals: Not progressing     Interval History: ATTENDED ONLY WHILE EATING SNACK     Diagnosis: MAJOR DEPRESSIVE DISORDER WITH PSYCHOSIS     Plan: Continue treatment on BMU

## 2023-05-30 NOTE — PATIENT CARE CONFERENCE
Spoke with stefano Bryant who is patient's girlfriend.  Advised of pending discharge.  She is concerned about his past behaviors and wether or not she can stay with him.  Suggested she get some therapy to try to figure that out for herself.

## 2023-05-30 NOTE — PROGRESS NOTES
"[facility] Behavioral Health Unit  Psychiatry  Progress Note    Patient Name: Raymond Quinn  MRN: [id number]   Code Status: Full Code  Admission Date: 5/25/2023  Hospital Length of Stay: 5 days  Expected Discharge Date:   Attending Physician: José Bethea MD  Primary Care Provider: Primary Doctor No    Current Legal Status: PEC    Patient information was obtained from patient.       Subjective:     Patient is a 43 y.o., male, presents with:    Principal Problem:MDD (major depressive disorder), recurrent, severe, with psychosis    Chief Complaint: Good    HPI: Raymond Quinn is a 43 y.o. male placed under a PEC at [facility]. He was originally OPC'd by his fiancee for threatening suicide with a gun. Upon interview he stated that he and his girlfriend had gotten into an argument because "some things came to light that may have put a strain on our relationship". He was being vague and was difficulty to understand at first and then finally said, "ok I will whisper it-she doesn't sell jewelry out of her house like she said; she's an [employer] agent". He then began speaking of what she could possibly be doing for them and what kind of evidence she was gathering-none of which made any sense. The fiancee also had reported that he had become bizarre and extremely paranoid over the last few weeks and that she wasn't sure why. She did verbalize also that Raymond's father passed away 2 and 1/2 weeks ago and that this may be contributing. She also stated that he verbalized that he was going to "blow his brains out". Today he denies everything. He stated that he has no recollection of saying that and that he "seriously doubts" that he would say something like that. He reports that he is not depressed over his father's passing because after two major strokes and "just lingering around on hospice" he knows that he is in a better place and no longer suffering. Of note his UDS was positive for methamphetamine. 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 The girlfriend reports that he drinks a 6 pack of beer a day. He minimized the methamphetamine use and stated "it was just a little bit and only once". His new symptoms of psychosis may simply be due to substance abuse. He does have a history of depression. Reports seeing someone when he was 15 years old (when his parents where ) and only took medications for 2 weeks. "I stopped all of that because it made me feel so weird". He was hospitalized once at [facility] several years ago but refused to take medications there. During our interview he did scan the room often, attempted to  something on the floor that wasn't there and did appear to be paranoid. He adamantly refuses to take any medications.       Hospital Course: The patient was admitted to the psychiatric unit and monitored for safety. The medications were reconciled. A history and physical was completed by the primary care doctor and medical issues were addressed. The patient was provided with individual and group therapy.     5/26/23-Pt guarded and superficial for interview, affect is anxious and somewhat irritable. Pt opens the interview by stating the NP told him he would be discharged yesterday or today, which is almost certainly an attempt at manipulation - no suggestion that this was the case per APRN's note or per staff's reports. Pt says there is absolutely nothing wrong with him and he needs to return to work. I re-visited the comments he made about his girlfriend being in the [employer], he replies "I would like to keep that confidential." Goes on to say that he knows she is in the [employer] for 100% fact because "the way her dad reacted when I told him." States he has "dotted the I's and crossed the t's" on this theory so it is essentially a fact in his mind. Reality testing very poor. Continues to minimize meth abuse issues, states only using infrequently thought suspect problem is more severe " "(collateral from girlfriend suggests this as well). He claims he was never suicidal and never wanted to hurt himself, voiced those threats as a means to manipulate his girlfriend in some way. He denies any AVH, but certainly would not disclose these even if they were present. He was offered a trial of medication for what I believe to be psychotic sx (likely meth induced), but he declines because he does not believe he has any issues currently.     5/28/23-Admitted for depression, SI, delusions. Sleeping and eating well. He has been refusing meds but did take Vistaril and Benadryl. Says the detergent causes a rash. He is stressed due to death of his father recently. "I found out some things about my finicke." He thinks she works for the [employer] but he still talks to her. He lives alone but lives close to her. Denies si/hi today. Denies hallucinations.  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    5/30/23-Seen for treatment team today. He still only takes Vistaril and Benadryl. He has fixed delusions of girlfriend being in the [employer] and has paranoia but says he is not too worried about it now. He has been calm here. No agitation. No si/hi. No active hallucinations. Sleeping well. He is cooperative in interview overall. Sleeping well. He is likely at baseline and will plan for discharge tomorrow given he is refusing psych meds.      Psychiatric Mental Status Exam:  General Appearance: appears stated age, well-developed, well-nourished  Arousal: alert  Behavior: cooperative  Movements and Motor Activity: no abnormal involuntary movements noted  Orientation: oriented to person, place, time, and situation  Speech: normal rate, normal rhythm, normal volume, normal tone  Mood: euthymic  Affect: mood-congruent  Thought Process: linear  Associations: intact  Thought Content and Perceptions: no suicidal ideation, no homicidal ideation, no " auditory hallucinations, no visual hallucinations. He does have baseline paranoid delusions.  Recent and Remote Memory: recent memory intact, remote memory intact; per interview/observation with patient  Attention and Concentration: intact, attentive to conversation; per interview/observation with patient  Fund of Knowledge: intact, aware of current events, vocabulary appropriate; based on history, vocabulary, fund of knowledge, syntax, grammar, and content  Insight: limited; based on understanding of severity of illness and HPI  Judgment: limited; based on patient's behavior and HPI    Family History    None       Tobacco Use    Smoking status: Not on file    Smokeless tobacco: Not on file   Substance and Sexual Activity    Alcohol use: Not on file    Drug use: Not on file    Sexual activity: Not on file     Psychotherapeutics (From admission, onward)      None             Review of Systems  Objective:     Vital Signs (Most Recent):  Temp: 97.6 °F (36.4 °C) (05/30/23 0608)  Pulse: 79 (05/30/23 0608)  Resp: 16 (05/30/23 0608)  BP: 118/81 (05/30/23 0608)  SpO2: 98 % (05/30/23 0608) Vital Signs (24h Range):  Temp:  [97.6 °F (36.4 °C)-98.1 °F (36.7 °C)] 97.6 °F (36.4 °C)  Pulse:  [79-86] 79  Resp:  [16] 16  SpO2:  [98 %-99 %] 98 %  BP: (118-136)/(81-95) 118/81     Height: 6' (182.9 cm)  Weight: 77.8 kg (171 lb 8.3 oz)  Body mass index is 23.26 kg/m².    No intake or output data in the 24 hours ending 05/30/23 0911     Physical Exam        Significant Labs: Last 72 Hours:   Recent Lab Results       None            Significant Imaging: I have reviewed all pertinent imaging results/findings within the past 24 hours.       Scheduled Medications:   nicotine  1 patch Transdermal Daily       PRN Medications:  acetaminophen, aluminum-magnesium hydroxide-simethicone, diphenhydrAMINE, hydrOXYzine pamoate, ibuprofen    Review of patient's allergies indicates:  No Known Allergies    Assessment/Plan:     * MDD (major  depressive disorder), recurrent, severe, with psychosis  He has residual delusional thoughts but refuses psych meds to help. He has no si/hi and is not acute danger to self or others. No hallucinations. No agitation on the unit. He will not likely improve with further hospital stay. Plan for discharge home tomorrow.          Need for Continued Hospitalization:  Patient stabilized and ready for discharge from inpatient psychiatric unit.    Anticipated Disposition:  Home or Self Care      José Bethea MD   Psychiatry  [facility]

## 2023-05-30 NOTE — NURSING
"Daily Nursing Note:      Behavior:    Patient (Raymond Quinn is a 43 y.o. male, : 1980, MRN: [id number]) demonstrating an affect that was blunted. Raymond demonstrating mood that is anxious. Raymond had an appearance that was clean. Raymond denies suicidal ideation. Raymond denies suicide plan. Raymond denies homicidal ideation. Raymond denies hallucinations.    Raymond's  height is 6' (1.829 m) and weight is 77.8 kg (171 lb 8.3 oz). His oral temperature is 98.1 °F (36.7 °C). His blood pressure is 136/95 (abnormal) and his pulse is 86. His respiration is 16 and oxygen saturation is 99%.     Raymond's last BM was noted on: . Continues to believe girlfriend is not honest with him regarding her profession. He states he is anxious to go home and denies any symptoms of depression or SI. "I feel OK,"       Intervention:    Encourage Raymond to perform self-hygiene, grooming, and changing of clothing. Monitor Raymond's behavior and program compliance. Monitor Raymond for suicidal ideation, homicidal ideation, sleep disturbance, and hallucinations. Encourage Raymond to eat all portions of meals and assess for meal preferences. Monitor Raymond for intake and output to ensure hydration. Notify the Physician/Physician Assistant/Advance Practice Registered Nurse (MD/PA/APRN) for any medication refusal and any change in patient condition.      Response:    Raymond verbalizes understand of unit process and procedures. Raymond is noncompliant with any medications except routine Nicotine Patch  and prn Vistaril and Benadryl.     Plan:     Continue to monitor per MD/PA/APRN orders; maintain patient safety. Q15min safety checks. Suicide precautions.  "

## 2023-05-30 NOTE — PLAN OF CARE
Will continue:  Problem: Adult Behavioral Health Plan of Care  Goal: Plan of Care Review  Outcome: Ongoing, Progressing  Flowsheets (Taken 5/30/2023 0404)  Plan of Care Reviewed With: patient  Goal: Patient-Specific Goal (Individualization)  Outcome: Ongoing, Progressing  Flowsheets (Taken 5/30/2023 0404)  Patient Personal Strengths:   motivated for recovery   motivated for treatment  Patient Vulnerabilities:   substance abuse/addiction   limited social skills   poor impulse control  Individualized Care Needs: monitor mood and behavior  Goal: Adheres to Safety Considerations for Self and Others  Outcome: Ongoing, Progressing  Flowsheets (Taken 5/30/2023 0404)  Adheres to Safety Considerations for Self and Others: making progress toward outcome  Intervention: Develop and Maintain Individualized Safety Plan  Flowsheets (Taken 5/30/2023 0404)  Safety Measures: suicide assessment completed  Goal: Optimized Coping Skills in Response to Life Stressors  Outcome: Ongoing, Progressing  Intervention: Promote Effective Coping Strategies  Flowsheets (Taken 5/30/2023 0404)  Supportive Measures:   self-care encouraged   self-responsibility promoted   verbalization of feelings encouraged   goal-setting facilitated  Goal: Develops/Participates in Therapeutic [place] to Support Successful Transition  Outcome: Ongoing, Progressing  Flowsheets (Taken 5/30/2023 0404)  Develops/Participates in Therapeutic [place] to Support Successful Transition: making progress toward outcome  Intervention: Mutually Develop Transition Plan  Flowsheets (Taken 5/30/2023 0404)  Patient/Family Anticipates Transition to: home  Concerns to be Addressed:   substance/tobacco abuse/use   coping/stress   compliance issue  Goal: Rounds/Family Conference  Outcome: Ongoing, Progressing

## 2023-05-30 NOTE — NURSING
Daily Nursing Note:      Behavior:    Patient (Raymond Quinn is a 43 y.o. male, : 1980, MRN: [id number]) demonstrating an affect that was incongruent..... Raymond demonstrating mood that is anxious. Raymond had an appearance that was clean and good hygiene. Raymond denies suicidal ideation. Raymond denies suicide plan. Raymond denies homicidal ideation. Raymond denies hallucinations.    Raymond's  height is 6' (1.829 m) and weight is 77.8 kg (171 lb 8.3 oz). His oral temperature is 97.7 °F (36.5 °C). His blood pressure is 154/96 (abnormal) and his pulse is 79. His respiration is 16 and oxygen saturation is 99%.     Raymond's last BM was noted on: 23      Intervention:    Encourage Raymond to perform self-hygiene, grooming, and changing of clothing. Monitor Raymond's behavior and program compliance. Monitor Raymond for suicidal ideation, homicidal ideation, sleep disturbance, and hallucinations. Encourage Raymond to eat all portions of meals and assess for meal preferences. Monitor Raymond for intake and output to ensure hydration. Notify the Physician/Physician Assistant/Advance Practice Registered Nurse (MD/PA/APRN) for any medication refusal and any change in patient condition.      Response:    Raymond verbalizes understand of unit process and procedures. Raymond met with Dr. Bethea for treatment team and care plan review. Plans are to discharge home tomorrow and follow up with [facility].      Plan:     Continue to monitor per MD/PA/APRN orders; maintain patient safety.

## 2023-05-30 NOTE — ASSESSMENT & PLAN NOTE
He has residual delusional thoughts but refuses psych meds to help. He has no si/hi and is not acute danger to self or others. No hallucinations. No agitation on the unit. He will not likely improve with further hospital stay. Plan for discharge home tomorrow.

## 2023-05-30 NOTE — PROGRESS NOTES
Recreation Therapy Progress Note    Date: 5 30 2023     Time: 10:00 am     Group Title: creative expression     Mood: flat and low energy     Behavior: isolated     Affect: flat and low energy     Speech: normal    Cognition: alert but distracted    Participation Level: pt isolated from group . Pt went back to bed after his snack but states he will come to my 2:00 group this afternoon.     Intervention:  Cont to offer rt services        Recreation Therapist   Signature: Abhishek Ryan MA CTRS

## 2023-05-30 NOTE — PROGRESS NOTES
Recreation Therapy Progress Note    Date: 5 30 2023     Time: 1400    Group Title: leisure skills    Mood: flat and low energy     Behavior: prompts needed to get out of bed and participate in outdoor activity. Pt agreed to attend and did well.     Affect: pt very quiet and needs prompts to interact and socialize    Speech: normal    Cognition: alert  and better eye contact today    Participation Level: 80 to 90% with prompts from staff and peers    Intervention:cont to offer rt services          Recreation Therapist   Signature: Abhishek Ryan MA CTRS

## 2023-05-30 NOTE — SUBJECTIVE & OBJECTIVE
Psychiatric Mental Status Exam:  General Appearance: appears stated age, well-developed, well-nourished  Arousal: alert  Behavior: cooperative  Movements and Motor Activity: no abnormal involuntary movements noted  Orientation: oriented to person, place, time, and situation  Speech: normal rate, normal rhythm, normal volume, normal tone  Mood: euthymic  Affect: mood-congruent  Thought Process: linear  Associations: intact  Thought Content and Perceptions: no suicidal ideation, no homicidal ideation, no auditory hallucinations, no visual hallucinations. He does have baseline paranoid delusions.  Recent and Remote Memory: recent memory intact, remote memory intact; per interview/observation with patient  Attention and Concentration: intact, attentive to conversation; per interview/observation with patient  Fund of Knowledge: intact, aware of current events, vocabulary appropriate; based on history, vocabulary, fund of knowledge, syntax, grammar, and content  Insight: limited; based on understanding of severity of illness and HPI  Judgment: limited; based on patient's behavior and HPI    Family History    None       Tobacco Use    Smoking status: Not on file    Smokeless tobacco: Not on file   Substance and Sexual Activity    Alcohol use: Not on file    Drug use: Not on file    Sexual activity: Not on file     Psychotherapeutics (From admission, onward)      None             Review of Systems  Objective:     Vital Signs (Most Recent):  Temp: 97.6 °F (36.4 °C) (05/30/23 0608)  Pulse: 79 (05/30/23 0608)  Resp: 16 (05/30/23 0608)  BP: 118/81 (05/30/23 0608)  SpO2: 98 % (05/30/23 0608) Vital Signs (24h Range):  Temp:  [97.6 °F (36.4 °C)-98.1 °F (36.7 °C)] 97.6 °F (36.4 °C)  Pulse:  [79-86] 79  Resp:  [16] 16  SpO2:  [98 %-99 %] 98 %  BP: (118-136)/(81-95) 118/81     Height: 6' (182.9 cm)  Weight: 77.8 kg (171 lb 8.3 oz)  Body mass index is 23.26 kg/m².    No intake or output data in the 24 hours ending 05/30/23 0911      Physical Exam        Significant Labs: Last 72 Hours:   Recent Lab Results       None            Significant Imaging: I have reviewed all pertinent imaging results/findings within the past 24 hours.

## 2023-05-31 VITALS
RESPIRATION RATE: 16 BRPM | HEIGHT: 72 IN | WEIGHT: 171.5 LBS | BODY MASS INDEX: 23.23 KG/M2 | SYSTOLIC BLOOD PRESSURE: 121 MMHG | DIASTOLIC BLOOD PRESSURE: 79 MMHG | HEART RATE: 72 BPM | OXYGEN SATURATION: 97 % | TEMPERATURE: 98 F

## 2023-05-31 PROCEDURE — 25000003 PHARM REV CODE 250: Performed by: PSYCHIATRY & NEUROLOGY

## 2023-05-31 PROCEDURE — S4991 NICOTINE PATCH NONLEGEND: HCPCS | Performed by: PSYCHIATRY & NEUROLOGY

## 2023-05-31 RX ADMIN — NICOTINE 1 PATCH: 21 PATCH, EXTENDED RELEASE TRANSDERMAL at 08:05

## 2023-05-31 NOTE — GROUP NOTE
Group Psychotherapy       Group Focus: Disease Model of Addiction      Number of patients in attendance: 4    Group Start Time: 1930  Group End Time:  2015  Groups Date: 5/30/2023  Group Topic:  Behavioral Health  Group Department: [facility] - Behavioral Health Unit  Group Facilitators:  Umm Batista RN  _____________________________________________________________________    Patient Name: Raymond Quinn  MRN: [id number]  Patient Class: IP- Psych   Admission Date\Time: 5/25/2023 12:05 AM  Hospital Length of Stay: 5  Primary Care Provider: Primary Doctor No     Referred by: Behavioral Medicine Unit Treatment Team     Target symptoms: Substance Abuse     Patient's response to treatment: Not Participating     Progress toward goals: Not progressing     Interval History: invited to but declined to attend     Diagnosis: major depressive disorder with severe psychosis     Plan: Continue treatment on BMU

## 2023-05-31 NOTE — NURSING
Daily Nursing Note:      Behavior:    Patient (Raymond Quinn is a 43 y.o. male, : 1980, MRN: [id number]) demonstrating an affect that was blunted.... Raymond demonstrating mood that is anxious and irritable. Raymond had an appearance that was clean. Raymond denies suicidal ideation. Raymond denies suicide plan. Raymond denies homicidal ideation. Raymond denies hallucinations.    Raymond's  height is 6' (1.829 m) and weight is 77.8 kg (171 lb 8.3 oz). His oral temperature is 97.7 °F (36.5 °C). His blood pressure is 121/79 and his pulse is 72. His respiration is 16 and oxygen saturation is 97%.     Raymond's last BM was noted on: 2023. Will be discharged to home.       Intervention:    Encourage Raymond to perform self-hygiene, grooming, and changing of clothing. Monitor Raymond's behavior and program compliance. Monitor Raymond for suicidal ideation, homicidal ideation, sleep disturbance, and hallucinations. Encourage Raymond to eat all portions of meals and assess for meal preferences. Monitor Raymond for intake and output to ensure hydration. Notify the Physician/Physician Assistant/Advance Practice Registered Nurse (MD/PA/APRN) for any medication refusal and any change in patient condition.      Response:    Raymond verbalizes understand of unit process and procedures. Raymond     Plan:     Continue to monitor per MD/PA/APRN orders; maintain patient safety. Q15min safety checks.

## 2023-05-31 NOTE — DISCHARGE SUMMARY
"[facility]  Psychiatry  Discharge Summary      Patient Name: Raymond Quinn  MRN: [id number]  Admission Date: 5/25/2023  Hospital Length of Stay: 6 days  Discharge Date and Time: 5/31/2023 10:40 AM  Attending Physician: No att. providers found   Discharging Provider: José Bethea MD  Primary Care Provider: Primary Doctor No    HPI:   Raymond Quinn is a 43 y.o. male placed under a PEC at [facility]. He was originally OPC'd by his fiancee for threatening suicide with a gun. Upon interview he stated that he and his girlfriend had gotten into an argument because "some things came to light that may have put a strain on our relationship". He was being vague and was difficulty to understand at first and then finally said, "ok I will whisper it-she doesn't sell jewelry out of her house like she said; she's an [employer] agent". He then began speaking of what she could possibly be doing for them and what kind of evidence she was gathering-none of which made any sense. The fiancee also had reported that he had become bizarre and extremely paranoid over the last few weeks and that she wasn't sure why. She did verbalize also that Raymond's father passed away 2 and 1/2 weeks ago and that this may be contributing. She also stated that he verbalized that he was going to "blow his brains out". Today he denies everything. He stated that he has no recollection of saying that and that he "seriously doubts" that he would say something like that. He reports that he is not depressed over his father's passing because after two major strokes and "just lingering around on hospice" he knows that he is in a better place and no longer suffering. Of note his UDS was positive for methamphetamine. 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 The girlfriend reports that he drinks a 6 pack of beer a day. He minimized the methamphetamine use and stated "it was just a little bit and only once". His new symptoms of " "psychosis may simply be due to substance abuse. He does have a history of depression. Reports seeing someone when he was 15 years old (when his parents where ) and only took medications for 2 weeks. "I stopped all of that because it made me feel so weird". He was hospitalized once at [facility] several years ago but refused to take medications there. During our interview he did scan the room often, attempted to  something on the floor that wasn't there and did appear to be paranoid. He adamantly refuses to take any medications.       Hospital Course:   The patient was admitted to the psychiatric unit and monitored for safety. The medications were reconciled. A history and physical was completed by the primary care doctor and medical issues were addressed. The patient was provided with individual and group therapy.     5/26/23-Pt guarded and superficial for interview, affect is anxious and somewhat irritable. Pt opens the interview by stating the NP told him he would be discharged yesterday or today, which is almost certainly an attempt at manipulation - no suggestion that this was the case per APRN's note or per staff's reports. Pt says there is absolutely nothing wrong with him and he needs to return to work. I re-visited the comments he made about his girlfriend being in the [employer], he replies "I would like to keep that confidential." Goes on to say that he knows she is in the [employer] for 100% fact because "the way her dad reacted when I told him." States he has "dotted the I's and crossed the t's" on this theory so it is essentially a fact in his mind. Reality testing very poor. Continues to minimize meth abuse issues, states only using infrequently thought suspect problem is more severe (collateral from girlfriend suggests this as well). He claims he was never suicidal and never wanted to hurt himself, voiced those threats as a means to manipulate his girlfriend in some way. He denies any AVH, but " "certainly would not disclose these even if they were present. He was offered a trial of medication for what I believe to be psychotic sx (likely meth induced), but he declines because he does not believe he has any issues currently.     5/28/23-Admitted for depression, SI, delusions. Sleeping and eating well. He has been refusing meds but did take Vistaril and Benadryl. Says the detergent causes a rash. He is stressed due to death of his father recently. "I found out some things about my finicke." He thinks she works for the [employer] but he still talks to her. He lives alone but lives close to her. Denies si/hi today. Denies hallucinations.  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    5/30/23-Seen for treatment team today. He still only takes Vistaril and Benadryl. He has fixed delusions of girlfriend being in the [employer] and has paranoia but says he is not too worried about it now. He has been calm here. No agitation. No si/hi. No active hallucinations. Sleeping well. He is cooperative in interview overall. Sleeping well. He is likely at baseline and will plan for discharge tomorrow given he is refusing psych meds.       Goals of Care Treatment Preferences:  Code Status: Full Code      * No surgery found *     Consults:   Consults (From admission, onward)        Status Ordering Provider     Inpatient consult to Hospital Medicine  Once        Provider:  MD Kassie Oneal SCOTT D        Physical Exam     Significant Labs:   Last 72 Hours:   Recent Lab Results     None          Significant Imaging: I have reviewed all pertinent imaging results/findings within the past 24 hours.    Smoking Cessation:   Does the patient smoke? Yes  Does the patient want a prescription for Smoking Cessation? Yes  Does the patient want counseling for Smoking Cessation? 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  Service: Psychiatry      Functional Condition: Independent ambulation    Discharged Condition: fair    Disposition: Home or Self Care    Follow Up:   Follow-up Information     [facility] Follow up.    Specialties: Behavioral Health, Psychiatry, Psychology  Why: Referral sent  Contact information:  [address] [place] [address] DR Prabhakar AZAR [address] [phone]                       Patient Instructions:   No discharge procedures on file.  Medications:  Reconciled Home Medications:      Medication List      START taking these medications    nicotine 21 mg/24 hr  Commonly known as: NICODERM CQ  Place 1 patch onto the skin once daily.          Is patient being discharged on multiple antipsychotics? No         José Bethea MD  Psychiatry  [facility]

## 2023-05-31 NOTE — PROGRESS NOTES
Recreation Therapy Progress Note    Date: 5 31 2023     Time: 10:00 am     Group Title: creative expression     Mood: anxious for ride to pisk him up for DC    Behavior: pt refused group     Affect: anxious on ride picking him up    Speech: normal    Cognition: alert     Participation Level: 0%  pt refused group    Intervention:pt did complete 95%of treatment goals on treatment plan for rt services. Pt was excited to go home and refused last group.           Recreation Therapist   Signature: Abhishek king MA CTRS

## 2023-05-31 NOTE — NURSING
Discharge Note:    Raymond Quinn is a 43 y.o. male, : 1980, MRN: [id number], admitted on 2023 for José Bethea MD with a diagnosis of MDD (major depressive disorder), recurrent, severe, with psychosis [F33.3].    Patient discharged on 2023 per physician orders in stable condition. Patient denied suicidal ideation, homicidal ideation, or hallucinations. Patient was discharged with valuables, personal belongings, prescriptions, discharge instructions, and an educational handout explaining the diagnosis and prescribed medications. Patient verbalized understanding of the discharge instructions and importance of follow-up visits. Patient was escorted out of the facility by [facility] and placed into a private vehicle to be transported  home.     Patient discharged on the following medications:     Medication List        START taking these medications      nicotine 21 mg/24 hr  Commonly known as: NICODERM CQ  Place 1 patch onto the skin once daily.               Where to Get Your Medications        These medications were sent to [id number] [address] & [address] [address]      Hours: 24-hours Phone: [phone]   nicotine 21 mg/24 hr

## 2024-11-30 ENCOUNTER — HOSPITAL ENCOUNTER (EMERGENCY)
Facility: HOSPITAL | Age: 44
Discharge: PSYCHIATRIC OR SUBSTANCE USE FACILITY | End: 2024-11-30
Attending: INTERNAL MEDICINE

## 2024-11-30 VITALS
OXYGEN SATURATION: 97 % | BODY MASS INDEX: 25.05 KG/M2 | WEIGHT: 175 LBS | RESPIRATION RATE: 18 BRPM | HEIGHT: 70 IN | TEMPERATURE: 99 F | SYSTOLIC BLOOD PRESSURE: 136 MMHG | HEART RATE: 60 BPM | DIASTOLIC BLOOD PRESSURE: 82 MMHG

## 2024-11-30 DIAGNOSIS — Z00.8 MEDICAL CLEARANCE FOR PSYCHIATRIC ADMISSION: ICD-10-CM

## 2024-11-30 DIAGNOSIS — F20.9 ACUTE EXACERBATION OF CHRONIC SCHIZOPHRENIA: Primary | ICD-10-CM

## 2024-11-30 LAB
ALBUMIN SERPL-MCNC: 4.4 G/DL (ref 3.5–5)
ALBUMIN/GLOB SERPL: 1.6 RATIO (ref 1.1–2)
ALP SERPL-CCNC: 84 UNIT/L (ref 40–150)
ALT SERPL-CCNC: 14 UNIT/L (ref 0–55)
ANION GAP SERPL CALC-SCNC: 12 MEQ/L
APAP SERPL-MCNC: <3 UG/ML (ref 10–30)
AST SERPL-CCNC: 18 UNIT/L (ref 5–34)
BASOPHILS # BLD AUTO: 0.06 X10(3)/MCL
BASOPHILS NFR BLD AUTO: 0.7 %
BILIRUB SERPL-MCNC: 0.5 MG/DL
BUN SERPL-MCNC: 7.3 MG/DL (ref 8.9–20.6)
CALCIUM SERPL-MCNC: 9.9 MG/DL (ref 8.4–10.2)
CHLORIDE SERPL-SCNC: 105 MMOL/L (ref 98–107)
CO2 SERPL-SCNC: 25 MMOL/L (ref 22–29)
CREAT SERPL-MCNC: 1.06 MG/DL (ref 0.72–1.25)
CREAT/UREA NIT SERPL: 7
EOSINOPHIL # BLD AUTO: 0.06 X10(3)/MCL (ref 0–0.9)
EOSINOPHIL NFR BLD AUTO: 0.7 %
ERYTHROCYTE [DISTWIDTH] IN BLOOD BY AUTOMATED COUNT: 11.2 % (ref 11.5–17)
ETHANOL SERPL-MCNC: 78 MG/DL
GFR SERPLBLD CREATININE-BSD FMLA CKD-EPI: >60 ML/MIN/1.73/M2
GLOBULIN SER-MCNC: 2.8 GM/DL (ref 2.4–3.5)
GLUCOSE SERPL-MCNC: 105 MG/DL (ref 74–100)
HCT VFR BLD AUTO: 44.6 % (ref 42–52)
HGB BLD-MCNC: 16.4 G/DL (ref 14–18)
IMM GRANULOCYTES # BLD AUTO: 0.02 X10(3)/MCL (ref 0–0.04)
IMM GRANULOCYTES NFR BLD AUTO: 0.2 %
LYMPHOCYTES # BLD AUTO: 2.64 X10(3)/MCL (ref 0.6–4.6)
LYMPHOCYTES NFR BLD AUTO: 32.7 %
MCH RBC QN AUTO: 33.1 PG (ref 27–31)
MCHC RBC AUTO-ENTMCNC: 36.8 G/DL (ref 33–36)
MCV RBC AUTO: 90.1 FL (ref 80–94)
MONOCYTES # BLD AUTO: 0.5 X10(3)/MCL (ref 0.1–1.3)
MONOCYTES NFR BLD AUTO: 6.2 %
NEUTROPHILS # BLD AUTO: 4.8 X10(3)/MCL (ref 2.1–9.2)
NEUTROPHILS NFR BLD AUTO: 59.5 %
NRBC BLD AUTO-RTO: 0 %
PLATELET # BLD AUTO: 297 X10(3)/MCL (ref 130–400)
PMV BLD AUTO: 10.4 FL (ref 7.4–10.4)
POTASSIUM SERPL-SCNC: 3.4 MMOL/L (ref 3.5–5.1)
PROT SERPL-MCNC: 7.2 GM/DL (ref 6.4–8.3)
RBC # BLD AUTO: 4.95 X10(6)/MCL (ref 4.7–6.1)
SALICYLATES SERPL-MCNC: <5 MG/DL (ref 15–30)
SODIUM SERPL-SCNC: 142 MMOL/L (ref 136–145)
TSH SERPL-ACNC: 1.67 UIU/ML (ref 0.35–4.94)
WBC # BLD AUTO: 8.08 X10(3)/MCL (ref 4.5–11.5)

## 2024-11-30 PROCEDURE — 25000003 PHARM REV CODE 250: Performed by: INTERNAL MEDICINE

## 2024-11-30 PROCEDURE — 96372 THER/PROPH/DIAG INJ SC/IM: CPT

## 2024-11-30 PROCEDURE — 80143 DRUG ASSAY ACETAMINOPHEN: CPT | Performed by: INTERNAL MEDICINE

## 2024-11-30 PROCEDURE — 93005 ELECTROCARDIOGRAM TRACING: CPT

## 2024-11-30 PROCEDURE — 80053 COMPREHEN METABOLIC PANEL: CPT | Performed by: INTERNAL MEDICINE

## 2024-11-30 PROCEDURE — 63600175 PHARM REV CODE 636 W HCPCS

## 2024-11-30 PROCEDURE — 63600175 PHARM REV CODE 636 W HCPCS: Performed by: INTERNAL MEDICINE

## 2024-11-30 PROCEDURE — 96372 THER/PROPH/DIAG INJ SC/IM: CPT | Performed by: INTERNAL MEDICINE

## 2024-11-30 PROCEDURE — 85025 COMPLETE CBC W/AUTO DIFF WBC: CPT | Performed by: INTERNAL MEDICINE

## 2024-11-30 PROCEDURE — 99285 EMERGENCY DEPT VISIT HI MDM: CPT | Mod: 25

## 2024-11-30 PROCEDURE — 82077 ASSAY SPEC XCP UR&BREATH IA: CPT | Performed by: INTERNAL MEDICINE

## 2024-11-30 PROCEDURE — 80179 DRUG ASSAY SALICYLATE: CPT | Performed by: INTERNAL MEDICINE

## 2024-11-30 PROCEDURE — 84443 ASSAY THYROID STIM HORMONE: CPT | Performed by: INTERNAL MEDICINE

## 2024-11-30 RX ORDER — ZIPRASIDONE MESYLATE 20 MG/ML
20 INJECTION, POWDER, LYOPHILIZED, FOR SOLUTION INTRAMUSCULAR ONCE AS NEEDED
Status: COMPLETED | OUTPATIENT
Start: 2024-11-30 | End: 2024-11-30

## 2024-11-30 RX ORDER — DIPHENHYDRAMINE HYDROCHLORIDE 50 MG/ML
50 INJECTION INTRAMUSCULAR; INTRAVENOUS ONCE AS NEEDED
Status: COMPLETED | OUTPATIENT
Start: 2024-11-30 | End: 2024-11-30

## 2024-11-30 RX ORDER — MIDAZOLAM HYDROCHLORIDE 2 MG/2ML
2 INJECTION, SOLUTION INTRAMUSCULAR; INTRAVENOUS ONCE AS NEEDED
Status: COMPLETED | OUTPATIENT
Start: 2024-11-30 | End: 2024-11-30

## 2024-11-30 RX ORDER — IBUPROFEN 200 MG
1 TABLET ORAL DAILY
Status: DISCONTINUED | OUTPATIENT
Start: 2024-11-30 | End: 2024-11-30 | Stop reason: HOSPADM

## 2024-11-30 RX ORDER — MIDAZOLAM HYDROCHLORIDE 2 MG/2ML
INJECTION, SOLUTION INTRAMUSCULAR; INTRAVENOUS
Status: COMPLETED
Start: 2024-11-30 | End: 2024-11-30

## 2024-11-30 RX ORDER — DIPHENHYDRAMINE HYDROCHLORIDE 50 MG/ML
INJECTION INTRAMUSCULAR; INTRAVENOUS
Status: COMPLETED
Start: 2024-11-30 | End: 2024-11-30

## 2024-11-30 RX ADMIN — HYPROMELLOSE 2910 1 DROP: 5 SOLUTION/ DROPS OPHTHALMIC at 02:11

## 2024-11-30 RX ADMIN — MIDAZOLAM HYDROCHLORIDE 2 MG: 2 INJECTION, SOLUTION INTRAMUSCULAR; INTRAVENOUS at 01:11

## 2024-11-30 RX ADMIN — ZIPRASIDONE MESYLATE 20 MG: 20 INJECTION, POWDER, LYOPHILIZED, FOR SOLUTION INTRAMUSCULAR at 01:11

## 2024-11-30 RX ADMIN — DIPHENHYDRAMINE HYDROCHLORIDE 50 MG: 50 INJECTION INTRAMUSCULAR; INTRAVENOUS at 01:11

## 2024-11-30 RX ADMIN — MIDAZOLAM HYDROCHLORIDE 2 MG: 1 INJECTION, SOLUTION INTRAMUSCULAR; INTRAVENOUS at 01:11

## 2024-11-30 NOTE — ED PROVIDER NOTES
"Encounter Date: 11/30/2024       History     Chief Complaint   Patient presents with    Psychiatric Evaluation     Pt. Arrives escorted by [employer] for psych eval. Pt. Paranoid, delusional     n due to trespassing. On arrival to the property, patient was identified as the caller ex-. Pt claims he is a homeland  invesigating "rapers" on the streets of [place]. Pt states "rapers" are not allowed in the streets of [place]. States the goverment did some false allegations towards him. Denies mental health problems, wife states Hx of schizophrenia.    The history is provided by the patient and the police.     Review of patient's allergies indicates:  No Known Allergies  History reviewed. No pertinent past medical history.  History reviewed. No pertinent surgical history.  No family history on file.  Social History     Tobacco Use    Smoking status: Some Days     Types: Cigarettes    Smokeless tobacco: Never   Substance Use Topics    Alcohol use: Yes    Drug use: Not Currently     Review of Systems   Unable to perform ROS: Psychiatric disorder       Physical Exam     Initial Vitals [11/30/24 0103]   BP Pulse Resp Temp SpO2   (!) 154/88 (!) 115 17 98.6 °F (37 °C) 98 %      MAP       --         Physical Exam    Nursing note and vitals reviewed.  Constitutional: He appears well-developed.   HENT:   Head: Normocephalic and atraumatic.   Eyes: Conjunctivae and EOM are normal. Pupils are equal, round, and reactive to light.   Contact lenses in place   Neck: Neck supple. No thyromegaly present. No JVD present.   Normal range of motion.  Cardiovascular:  Regular rhythm, normal heart sounds and intact distal pulses.           tachycardic   Pulmonary/Chest: Breath sounds normal. No respiratory distress.   Abdominal: Abdomen is soft. Bowel sounds are normal. He exhibits no distension. There is no abdominal tenderness. There is no rebound and no guarding.   Musculoskeletal:         General: No edema. Normal range " of motion.      Cervical back: Normal range of motion and neck supple.     Neurological: He is alert and oriented to person, place, and time. He has normal strength. GCS score is 15. GCS eye subscore is 4. GCS verbal subscore is 5. GCS motor subscore is 6.   Skin: Skin is warm and dry. No rash noted.   Psychiatric: His affect is labile. His speech is rapid and/or pressured. He is agitated, hyperactive and actively hallucinating. Thought content is paranoid and delusional. Cognition and memory are impaired. He expresses inappropriate judgment. He is attentive.         ED Course   Procedures  Labs Reviewed   COMPREHENSIVE METABOLIC PANEL - Abnormal       Result Value    Sodium 142      Potassium 3.4 (*)     Chloride 105      CO2 25      Glucose 105 (*)     Blood Urea Nitrogen 7.3 (*)     Creatinine 1.06      Calcium 9.9      Protein Total 7.2      Albumin 4.4      Globulin 2.8      Albumin/Globulin Ratio 1.6      Bilirubin Total 0.5      ALP 84      ALT 14      AST 18      eGFR >60      Anion Gap 12.0      BUN/Creatinine Ratio 7     ALCOHOL,MEDICAL (ETHANOL) - Abnormal    Ethanol Level 78.0 (*)    ACETAMINOPHEN LEVEL - Abnormal    Acetaminophen Level <3.0 (*)    SALICYLATE LEVEL - Abnormal    Salicylate Level <5.0 (*)    CBC WITH DIFFERENTIAL - Abnormal    WBC 8.08      RBC 4.95      Hgb 16.4      Hct 44.6      MCV 90.1      MCH 33.1 (*)     MCHC 36.8 (*)     RDW 11.2 (*)     Platelet 297      MPV 10.4      Neut % 59.5      Lymph % 32.7      Mono % 6.2      Eos % 0.7      Basophil % 0.7      Lymph # 2.64      Neut # 4.80      Mono # 0.50      Eos # 0.06      Baso # 0.06      IG# 0.02      IG% 0.2      NRBC% 0.0     TSH - Normal    TSH 1.668     CBC W/ AUTO DIFFERENTIAL    Narrative:     The following orders were created for panel order CBC auto differential.  Procedure                               Abnormality         Status                     ---------                               -----------         ------                      CBC with Differential[[id number]]       Abnormal            Final result                 Please view results for these tests on the individual orders.   URINALYSIS, REFLEX TO URINE CULTURE   DRUG SCREEN, URINE (BEAKER)     EKG Readings: (Independently Interpreted)   Initial Reading: No STEMI. Rhythm: Sinus Tachycardia. Heart Rate: 114. Ectopy: No Ectopy. Conduction: Normal. ST Segments: Normal ST Segments. T Waves: Normal. Axis: Right Axis Deviation. Clinical Impression: Sinus Tachycardia       Imaging Results    None          Medications   nicotine 14 mg/24 hr 1 patch (1 patch Transdermal Not Given 11/30/24 0115)   artificial tears 0.5 % ophthalmic solution 1 drop (1 drop Both Eyes Given 11/30/24 0202)   ziprasidone injection 20 mg (20 mg Intramuscular Given 11/30/24 0121)   midazolam (PF) (VERSED) 1 mg/mL injection 2 mg (2 mg Intramuscular Given 11/30/24 0121)   diphenhydrAMINE injection 50 mg (50 mg Intramuscular Given 11/30/24 0121)     Medical Decision Making  Amount and/or Complexity of Data Reviewed  Independent Historian:      Details: Police  On arrival to the [facility], patient was identified as the caller ex-.   Labs: ordered. Decision-making details documented in ED Course.  ECG/medicine tests: ordered and independent interpretation performed. Decision-making details documented in ED Course.  Discussion of management or test interpretation with external provider(s): Case discussed with our psychiatric nurse for transfer. Information will be faxed to local psychiatric institutions for placement. MD will be available for report if needed after nurse report. Patient medically cleared and stable at this time for transfer process.       Risk  OTC drugs.  Prescription drug management.      Additional MDM:   Differential Diagnosis:   Schizophrenia exacerbation, bipolar psychosis, drug induced psychosis, thyrotoxicosis, renal failure, liver failure, toxydrome, among others                   Medically cleared for psychiatry placement: 11/30/2024  2:34 AM                   Clinical Impression:  Final diagnoses:  [Z00.8] Medical clearance for psychiatric admission  [F20.9] Acute exacerbation of chronic schizophrenia (Primary)          ED Disposition Condition    Transfer to Psych Facility [facility]          ED Prescriptions    None       Follow-up Information    None          Mckay Ogden MD  11/30/24 0239

## 2024-12-02 LAB
OHS QRS DURATION: 94 MS
OHS QTC CALCULATION: 447 MS

## 2024-12-25 ENCOUNTER — HOSPITAL ENCOUNTER (EMERGENCY)
Facility: HOSPITAL | Age: 44
Discharge: PSYCHIATRIC OR SUBSTANCE USE FACILITY | End: 2024-12-25
Attending: EMERGENCY MEDICINE

## 2024-12-25 VITALS
HEART RATE: 108 BPM | TEMPERATURE: 99 F | HEIGHT: 72 IN | SYSTOLIC BLOOD PRESSURE: 134 MMHG | RESPIRATION RATE: 17 BRPM | OXYGEN SATURATION: 98 % | DIASTOLIC BLOOD PRESSURE: 89 MMHG | WEIGHT: 157.63 LBS | BODY MASS INDEX: 21.35 KG/M2

## 2024-12-25 DIAGNOSIS — Z00.8 MEDICAL CLEARANCE FOR PSYCHIATRIC ADMISSION: Primary | ICD-10-CM

## 2024-12-25 LAB
ALBUMIN SERPL-MCNC: 4.5 G/DL (ref 3.5–5)
ALBUMIN/GLOB SERPL: 1.6 RATIO (ref 1.1–2)
ALP SERPL-CCNC: 80 UNIT/L (ref 40–150)
ALT SERPL-CCNC: 11 UNIT/L (ref 0–55)
AMPHET UR QL SCN: POSITIVE
ANION GAP SERPL CALC-SCNC: 2 MEQ/L
APAP SERPL-MCNC: <3 UG/ML (ref 10–30)
AST SERPL-CCNC: 16 UNIT/L (ref 5–34)
BACTERIA #/AREA URNS AUTO: ABNORMAL /HPF
BARBITURATE SCN PRESENT UR: NEGATIVE
BASOPHILS # BLD AUTO: 0.06 X10(3)/MCL
BASOPHILS NFR BLD AUTO: 0.5 %
BENZODIAZ UR QL SCN: NEGATIVE
BILIRUB SERPL-MCNC: 0.6 MG/DL
BILIRUB UR QL STRIP.AUTO: ABNORMAL
BUN SERPL-MCNC: 25.4 MG/DL (ref 8.9–20.6)
CALCIUM SERPL-MCNC: 9.7 MG/DL (ref 8.4–10.2)
CANNABINOIDS UR QL SCN: NEGATIVE
CHLORIDE SERPL-SCNC: 110 MMOL/L (ref 98–107)
CLARITY UR: CLEAR
CO2 SERPL-SCNC: 25 MMOL/L (ref 22–29)
COCAINE UR QL SCN: NEGATIVE
COLOR UR AUTO: YELLOW
CREAT SERPL-MCNC: 1.04 MG/DL (ref 0.72–1.25)
CREAT/UREA NIT SERPL: 24
EOSINOPHIL # BLD AUTO: 0.09 X10(3)/MCL (ref 0–0.9)
EOSINOPHIL NFR BLD AUTO: 0.8 %
ERYTHROCYTE [DISTWIDTH] IN BLOOD BY AUTOMATED COUNT: 11.4 % (ref 11.5–17)
ETHANOL SERPL-MCNC: <10 MG/DL
FENTANYL UR QL SCN: NEGATIVE
GFR SERPLBLD CREATININE-BSD FMLA CKD-EPI: >60 ML/MIN/1.73/M2
GLOBULIN SER-MCNC: 2.9 GM/DL (ref 2.4–3.5)
GLUCOSE SERPL-MCNC: 95 MG/DL (ref 74–100)
GLUCOSE UR QL STRIP: NORMAL
HCT VFR BLD AUTO: 41.6 % (ref 42–52)
HGB BLD-MCNC: 14.6 G/DL (ref 14–18)
HGB UR QL STRIP: ABNORMAL
HYALINE CASTS #/AREA URNS LPF: ABNORMAL /LPF
IMM GRANULOCYTES # BLD AUTO: 0.03 X10(3)/MCL (ref 0–0.04)
IMM GRANULOCYTES NFR BLD AUTO: 0.3 %
KETONES UR QL STRIP: ABNORMAL
LEUKOCYTE ESTERASE UR QL STRIP: 25
LYMPHOCYTES # BLD AUTO: 3.01 X10(3)/MCL (ref 0.6–4.6)
LYMPHOCYTES NFR BLD AUTO: 25.8 %
MCH RBC QN AUTO: 32.5 PG (ref 27–31)
MCHC RBC AUTO-ENTMCNC: 35.1 G/DL (ref 33–36)
MCV RBC AUTO: 92.7 FL (ref 80–94)
MDMA UR QL SCN: NEGATIVE
MONOCYTES # BLD AUTO: 0.78 X10(3)/MCL (ref 0.1–1.3)
MONOCYTES NFR BLD AUTO: 6.7 %
MUCOUS THREADS URNS QL MICRO: ABNORMAL /LPF
NEUTROPHILS # BLD AUTO: 7.69 X10(3)/MCL (ref 2.1–9.2)
NEUTROPHILS NFR BLD AUTO: 65.9 %
NITRITE UR QL STRIP: NEGATIVE
NRBC BLD AUTO-RTO: 0 %
OPIATES UR QL SCN: NEGATIVE
PCP UR QL: NEGATIVE
PH UR STRIP: 6 [PH]
PH UR: 6 [PH] (ref 3–11)
PLATELET # BLD AUTO: 314 X10(3)/MCL (ref 130–400)
PMV BLD AUTO: 10.2 FL (ref 7.4–10.4)
POTASSIUM SERPL-SCNC: 3.6 MMOL/L (ref 3.5–5.1)
PROT SERPL-MCNC: 7.4 GM/DL (ref 6.4–8.3)
PROT UR QL STRIP: ABNORMAL
RBC # BLD AUTO: 4.49 X10(6)/MCL (ref 4.7–6.1)
RBC #/AREA URNS AUTO: ABNORMAL /HPF
SODIUM SERPL-SCNC: 137 MMOL/L (ref 136–145)
SP GR UR STRIP.AUTO: 1.04 (ref 1–1.03)
SPECIFIC GRAVITY, URINE AUTO (.000) (OHS): 1.04 (ref 1–1.03)
SQUAMOUS #/AREA URNS LPF: ABNORMAL /HPF
TSH SERPL-ACNC: 1.1 UIU/ML (ref 0.35–4.94)
UROBILINOGEN UR STRIP-ACNC: ABNORMAL
WBC # BLD AUTO: 11.66 X10(3)/MCL (ref 4.5–11.5)
WBC #/AREA URNS AUTO: ABNORMAL /HPF

## 2024-12-25 PROCEDURE — 82077 ASSAY SPEC XCP UR&BREATH IA: CPT | Performed by: EMERGENCY MEDICINE

## 2024-12-25 PROCEDURE — 84443 ASSAY THYROID STIM HORMONE: CPT | Performed by: EMERGENCY MEDICINE

## 2024-12-25 PROCEDURE — 80307 DRUG TEST PRSMV CHEM ANLYZR: CPT | Performed by: EMERGENCY MEDICINE

## 2024-12-25 PROCEDURE — 85025 COMPLETE CBC W/AUTO DIFF WBC: CPT | Performed by: EMERGENCY MEDICINE

## 2024-12-25 PROCEDURE — 99285 EMERGENCY DEPT VISIT HI MDM: CPT

## 2024-12-25 PROCEDURE — 80053 COMPREHEN METABOLIC PANEL: CPT | Performed by: EMERGENCY MEDICINE

## 2024-12-25 PROCEDURE — 80143 DRUG ASSAY ACETAMINOPHEN: CPT | Performed by: EMERGENCY MEDICINE

## 2024-12-25 PROCEDURE — 81001 URINALYSIS AUTO W/SCOPE: CPT | Performed by: EMERGENCY MEDICINE

## 2024-12-25 NOTE — ED PROVIDER NOTES
ED PROVIDER NOTE  12/25/2024    CHIEF COMPLAINT:   Chief Complaint   Patient presents with    Psychiatric Evaluation     Wants to go to [facility] for the auditory hallucinations that he is hearing. States that he found out some bad news today from the voices about his fiancee'. Would not disclose info       HISTORY OF PRESENT ILLNESS:   Raymond Quinn is a 44 y.o. male who presents with chief complaint Mental health evaluation.  Patient states he wants to voluntarily admit himself into a psychiatric facility due to having auditory hallucinations.  He states he has been hearing voices for years in the medication never help.  Denies having any suicidal or homicidal ideation.    The history is provided by the patient.         REVIEW OF SYSTEMS: as noted in the HPI.  NURSING NOTES REVIEWED      PAST MEDICAL/SURGICAL HISTORY: No past medical history on file. No past surgical history on file.    FAMILY HISTORY: No family history on file.    SOCIAL HISTORY:   Social History     Tobacco Use    Smoking status: Some Days     Types: Cigarettes    Smokeless tobacco: Never   Substance Use Topics    Alcohol use: Yes    Drug use: Not Currently       ALLERGIES: Review of patient's allergies indicates:  No Known Allergies    PHYSICAL EXAM:  Initial Vitals [12/25/24 1754]   BP Pulse Resp Temp SpO2   130/84 (!) 126 18 98.6 °F (37 °C) 97 %      MAP       --         Physical Exam    Nursing note and vitals reviewed.  Constitutional: He appears well-developed and well-nourished. No distress.   HENT:   Head: Normocephalic and atraumatic.   Nose: Nose normal. Mouth/Throat: Oropharynx is clear and moist and mucous membranes are normal.   Eyes: Conjunctivae and EOM are normal. Pupils are equal, round, and reactive to light.   Neck: Neck supple. No tracheal deviation present.   Cardiovascular:  Regular rhythm, normal heart sounds, intact distal pulses and normal pulses.   Tachycardia present.         Pulmonary/Chest: Effort normal and breath  sounds normal. No respiratory distress.   Abdominal: Abdomen is soft. There is no abdominal tenderness. There is no rebound and no guarding.   Musculoskeletal:         General: Normal range of motion.      Cervical back: Neck supple.     Neurological: He is alert and oriented to person, place, and time. GCS eye subscore is 4. GCS verbal subscore is 5. GCS motor subscore is 6.   CN II-XII intact. Moves all extremities. No gross sensory or motor deficits.   Skin: Skin is warm, dry and intact.   Psychiatric: He has a normal mood and affect. His speech is normal and behavior is normal. Cognition and memory are normal. He expresses no homicidal and no suicidal ideation.         RESULTS:  Labs Reviewed   COMPREHENSIVE METABOLIC PANEL - Abnormal       Result Value    Sodium 137      Potassium 3.6      Chloride 110 (*)     CO2 25      Glucose 95      Blood Urea Nitrogen 25.4 (*)     Creatinine 1.04      Calcium 9.7      Protein Total 7.4      Albumin 4.5      Globulin 2.9      Albumin/Globulin Ratio 1.6      Bilirubin Total 0.6      ALP 80      ALT 11      AST 16      eGFR >60      Anion Gap 2.0      BUN/Creatinine Ratio 24     URINALYSIS, REFLEX TO URINE CULTURE - Abnormal    Color, UA Yellow      Appearance, UA Clear      Specific Gravity, UA 1.040 (*)     pH, UA 6.0      Protein, UA 1+ (*)     Glucose, UA Normal      Ketones, UA 1+ (*)     Blood, UA Trace (*)     Bilirubin, UA 1+ (*)     Urobilinogen, UA 2+ (*)     Nitrites, UA Negative      Leukocyte Esterase, UA 25 (*)     RBC, UA 6-10 (*)     WBC, UA 0-5      Bacteria, UA None Seen      Squamous Epithelial Cells, UA Occasional (*)     Mucous, UA Many (*)     Hyaline Casts, UA None Seen     DRUG SCREEN, URINE (BEAKER) - Abnormal    Amphetamines, Urine Positive (*)     Barbiturates, Urine Negative      Benzodiazepine, Urine Negative      Cannabinoids, Urine Negative      Cocaine, Urine Negative      Fentanyl, Urine Negative      MDMA, Urine Negative      Opiates, Urine  Negative      Phencyclidine, Urine Negative      pH, Urine 6.0      Specific Gravity, Urine Auto 1.040 (*)     Narrative:     Cut off concentrations:    Amphetamines - 1000 ng/ml  Barbiturates - 200 ng/ml  Benzodiazepine - 200 ng/ml  Cannabinoids (THC) - 50 ng/ml  Cocaine - 300 ng/ml  Fentanyl - 1.0 ng/ml  MDMA - 500 ng/ml  Opiates - 300 ng/ml   Phencyclidine (PCP) - 25 ng/ml    Specimen submitted for drug analysis and tested for pH and specific gravity in order to evaluate sample integrity. Suspect tampering if specific gravity is <1.003 and/or pH is not within the range of 4.5 - 8.0  False negatives may result form substances such as bleach added to urine.  False positives may result for the presence of a substance with similar chemical structure to the drug or its metabolite.    This test provides only a PRELIMINARY analytical test result. A more specific alternate chemical method must be used in order to obtain a confirmed analytical result. Gas chromatography/mass spectrometry (GC/MS) is the preferred confirmatory method. Other chemical confirmation methods are available. Clinical consideration and professional judgement should be applied to any drug of abuse test result, particularly when preliminary positive results are used.    Positive results will be confirmed only at the physicians request. Unconfirmed screening results are to be used only for medical purposes (treatment).        ACETAMINOPHEN LEVEL - Abnormal    Acetaminophen Level <3.0 (*)    CBC WITH DIFFERENTIAL - Abnormal    WBC 11.66 (*)     RBC 4.49 (*)     Hgb 14.6      Hct 41.6 (*)     MCV 92.7      MCH 32.5 (*)     MCHC 35.1      RDW 11.4 (*)     Platelet 314      MPV 10.2      Neut % 65.9      Lymph % 25.8      Mono % 6.7      Eos % 0.8      Basophil % 0.5      Lymph # 3.01      Neut # 7.69      Mono # 0.78      Eos # 0.09      Baso # 0.06      IG# 0.03      IG% 0.3      NRBC% 0.0     TSH - Normal    TSH 1.105     ALCOHOL,MEDICAL (ETHANOL) -  Normal    Ethanol Level <10.0     CBC W/ AUTO DIFFERENTIAL    Narrative:     The following orders were created for panel order CBC auto differential.  Procedure                               Abnormality         Status                     ---------                               -----------         ------                     CBC with Differential[[id number]]       Abnormal            Final result                 Please view results for these tests on the individual orders.     Imaging Results    None         PROCEDURES:  Procedures    ECG:       ED COURSE AND MEDICAL DECISION MAKING:  Medications - No data to display  ED Course as of 12/25/24 1905   Wed Dec 25, 2024   1836 WBC(!): 11.66 [IB]   1836 Hemoglobin: 14.6 [IB]   1836 Platelet Count: 314 [IB]   1843 Creatinine: 1.04 [IB]      ED Course User Index  [IB] Elias Zapien DO        Medical Decision Making  44-year-old male presents wanting inpatient psychiatric treatment for his auditory hallucinations that he has had for a very long time.  Denies having any suicide ideation.  Routine labs obtained for medical clearance.  He is medically cleared for psychiatric admission.    Amount and/or Complexity of Data Reviewed  Labs: ordered. Decision-making details documented in ED Course.    Risk  Prescription drug management.  Decision regarding hospitalization.  Diagnosis or treatment significantly limited by social determinants of health.        CLINICAL IMPRESSION:  1. Medical clearance for psychiatric admission        DISPOSITION:   ED Disposition Condition    Transfer to Psych Facility Stable            ED Prescriptions    None       Follow-up Information    None            Elias Zapien DO  12/25/24 1905

## 2025-01-01 ENCOUNTER — HOSPITAL ENCOUNTER (INPATIENT)
Facility: HOSPITAL | Age: 45
LOS: 5 days | Discharge: HOME | DRG: 885 | End: 2025-01-06
Attending: PSYCHIATRY & NEUROLOGY | Admitting: PSYCHIATRY & NEUROLOGY
Payer: MEDICAID

## 2025-01-01 DIAGNOSIS — F29 PSYCHOSIS: ICD-10-CM

## 2025-01-01 PROCEDURE — 11400000 HC PSYCH PRIVATE ROOM

## 2025-01-01 RX ORDER — LORAZEPAM 1 MG/1
2 TABLET ORAL EVERY 4 HOURS PRN
Status: DISCONTINUED | OUTPATIENT
Start: 2025-01-01 | End: 2025-01-02

## 2025-01-01 RX ORDER — LORAZEPAM 2 MG/ML
2 INJECTION INTRAMUSCULAR EVERY 4 HOURS PRN
Status: DISCONTINUED | OUTPATIENT
Start: 2025-01-01 | End: 2025-01-02

## 2025-01-01 RX ORDER — IBUPROFEN 200 MG
1 TABLET ORAL DAILY
Status: DISCONTINUED | OUTPATIENT
Start: 2025-01-01 | End: 2025-01-06 | Stop reason: HOSPADM

## 2025-01-01 RX ORDER — DIPHENHYDRAMINE HCL 50 MG
50 CAPSULE ORAL EVERY 4 HOURS PRN
Status: DISCONTINUED | OUTPATIENT
Start: 2025-01-01 | End: 2025-01-02

## 2025-01-01 RX ORDER — DIPHENHYDRAMINE HYDROCHLORIDE 50 MG/ML
50 INJECTION INTRAMUSCULAR; INTRAVENOUS EVERY 4 HOURS PRN
Status: DISCONTINUED | OUTPATIENT
Start: 2025-01-01 | End: 2025-01-02

## 2025-01-01 RX ORDER — HALOPERIDOL 5 MG/1
10 TABLET ORAL EVERY 4 HOURS PRN
Status: DISCONTINUED | OUTPATIENT
Start: 2025-01-01 | End: 2025-01-02

## 2025-01-01 RX ORDER — HALOPERIDOL 5 MG/ML
10 INJECTION INTRAMUSCULAR EVERY 4 HOURS PRN
Status: DISCONTINUED | OUTPATIENT
Start: 2025-01-01 | End: 2025-01-02

## 2025-01-01 RX ORDER — TRAZODONE HYDROCHLORIDE 100 MG/1
100 TABLET ORAL NIGHTLY PRN
Status: DISCONTINUED | OUTPATIENT
Start: 2025-01-01 | End: 2025-01-02

## 2025-01-01 RX ORDER — ACETAMINOPHEN 325 MG/1
650 TABLET ORAL EVERY 6 HOURS PRN
Status: DISCONTINUED | OUTPATIENT
Start: 2025-01-01 | End: 2025-01-06 | Stop reason: HOSPADM

## 2025-01-01 RX ORDER — HYDROXYZINE HYDROCHLORIDE 50 MG/1
50 TABLET, FILM COATED ORAL EVERY 4 HOURS PRN
Status: DISCONTINUED | OUTPATIENT
Start: 2025-01-01 | End: 2025-01-02

## 2025-01-01 RX ORDER — MUPIROCIN 20 MG/G
OINTMENT TOPICAL 2 TIMES DAILY
Status: DISCONTINUED | OUTPATIENT
Start: 2025-01-01 | End: 2025-01-01

## 2025-01-01 RX ORDER — ALUMINUM HYDROXIDE, MAGNESIUM HYDROXIDE, AND SIMETHICONE 1200; 120; 1200 MG/30ML; MG/30ML; MG/30ML
30 SUSPENSION ORAL EVERY 6 HOURS PRN
Status: DISCONTINUED | OUTPATIENT
Start: 2025-01-01 | End: 2025-01-06 | Stop reason: HOSPADM

## 2025-01-01 NOTE — PLAN OF CARE
Problem: Adult Behavioral Health Plan of Care  Goal: Plan of Care Review  Outcome: Not Progressing  Flowsheets (Taken 1/1/2025 1534)  Patient Agreement with Plan of Care: agrees  Plan of Care Reviewed With: patient  Goal: Patient-Specific Goal (Individualization)  Outcome: Not Progressing  Flowsheets (Taken 1/1/2025 1534)  Patient Personal Strengths:   ability to maintain sobriety   medication/treatment adherence  Patient Vulnerabilities: family/relationship conflict  Goal: Adheres to Safety Considerations for Self and Others  Outcome: Not Progressing  Flowsheets (Taken 1/1/2025 1534)  Adheres to Safety Considerations for Self and Others: unable to achieve outcome  Goal: Absence of New-Onset Illness or Injury  Outcome: Not Progressing  Intervention: Prevent Infection  Flowsheets (Taken 1/1/2025 1534)  Infection Prevention: hand hygiene promoted  Goal: Optimized Coping Skills in Response to Life Stressors  Outcome: Not Progressing  Flowsheets (Taken 1/1/2025 1534)  Optimized Coping Skills in Response to Life Stressors: unable to achieve outcome  Intervention: Promote Effective Coping Strategies  Flowsheets (Taken 1/1/2025 1534)  Supportive Measures: active listening utilized  Goal: Develops/Participates in Therapeutic [place] to Support Successful Transition  Outcome: Not Progressing  Flowsheets (Taken 1/1/2025 1534)  Develops/Participates in Therapeutic [place] to Support Successful Transition: unable to achieve outcome  Goal: Rounds/Family Conference  Outcome: Not Progressing     Problem: Violence Risk or Actual  Goal: Anger and Impulse Control  Outcome: Not Progressing  Intervention: Minimize Safety Risk  Flowsheets (Taken 1/1/2025 1534)  Behavior Management: impulse control promoted  Sensory Stimulation Regulation:   auditory stimulation minimized   lighting decreased  De-Escalation Techniques: quiet time facilitated

## 2025-01-01 NOTE — PLAN OF CARE
Problem: Adult Behavioral Health Plan of Care  Goal: Plan of Care Review  Outcome: Not Progressing  Flowsheets (Taken 1/1/2025 1534)  Patient Agreement with Plan of Care: agrees  Plan of Care Reviewed With: patient  Goal: Patient-Specific Goal (Individualization)  Outcome: Not Progressing  Flowsheets (Taken 1/1/2025 1534)  Patient Personal Strengths:   ability to maintain sobriety   medication/treatment adherence  Patient Vulnerabilities: family/relationship conflict  Goal: Adheres to Safety Considerations for Self and Others  Outcome: Not Progressing  Flowsheets (Taken 1/1/2025 1534)  Adheres to Safety Considerations for Self and Others: unable to achieve outcome  Goal: Absence of New-Onset Illness or Injury  Outcome: Not Progressing  Intervention: Prevent Infection  Flowsheets (Taken 1/1/2025 1534)  Infection Prevention: hand hygiene promoted  Goal: Optimized Coping Skills in Response to Life Stressors  Outcome: Not Progressing  Flowsheets (Taken 1/1/2025 1534)  Optimized Coping Skills in Response to Life Stressors: unable to achieve outcome  Intervention: Promote Effective Coping Strategies  Flowsheets (Taken 1/1/2025 1534)  Supportive Measures: active listening utilized  Goal: Develops/Participates in Therapeutic [place] to Support Successful Transition  Outcome: Not Progressing  Flowsheets (Taken 1/1/2025 1534)  Develops/Participates in Therapeutic [place] to Support Successful Transition: unable to achieve outcome  Goal: Rounds/Family Conference  Outcome: Not Progressing     Problem: Violence Risk or Actual  Goal: Anger and Impulse Control  Outcome: Not Progressing  Intervention: Minimize Safety Risk  Flowsheets (Taken 1/1/2025 1534)  Behavior Management: impulse control promoted  Sensory Stimulation Regulation:   auditory stimulation minimized   lighting decreased  De-Escalation Techniques: quiet time facilitated

## 2025-01-01 NOTE — NURSING
Patient is an admission from [facility] . Accepted by DR Lyle Garzon. Patient with no known skin issue . No known drug allergies .Ambulatory with steady gait. Alert and oriented x 4 .Deneis Pain .Pleasant calm and cooperative. Patient is a 44 year old male with auditory hallucinations which onset six months ago per statement Paranoia stated that her girlfriend is a prostitute so the girlfriend is not the real person as it appears . 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 Was just released from another mental facility but refuses to take medications since it is not working for him. Denies  SI and HI at this time claimed of depression but denies  anxiety. Claimed of auditory hallucinations ,random voices heard by him but not telling him to hurt self.

## 2025-01-02 PROCEDURE — 25000003 PHARM REV CODE 250

## 2025-01-02 PROCEDURE — 25000003 PHARM REV CODE 250: Performed by: PSYCHIATRY & NEUROLOGY

## 2025-01-02 PROCEDURE — 11400000 HC PSYCH PRIVATE ROOM

## 2025-01-02 RX ORDER — LORAZEPAM 1 MG/1
2 TABLET ORAL EVERY 4 HOURS PRN
Status: DISCONTINUED | OUTPATIENT
Start: 2025-01-02 | End: 2025-01-06 | Stop reason: HOSPADM

## 2025-01-02 RX ORDER — LORAZEPAM 2 MG/ML
2 INJECTION INTRAMUSCULAR EVERY 4 HOURS PRN
Status: DISCONTINUED | OUTPATIENT
Start: 2025-01-02 | End: 2025-01-06 | Stop reason: HOSPADM

## 2025-01-02 RX ORDER — HYDROXYZINE HYDROCHLORIDE 50 MG/1
50 TABLET, FILM COATED ORAL EVERY 4 HOURS PRN
Status: DISCONTINUED | OUTPATIENT
Start: 2025-01-02 | End: 2025-01-06 | Stop reason: HOSPADM

## 2025-01-02 RX ORDER — TRAZODONE HYDROCHLORIDE 100 MG/1
100 TABLET ORAL NIGHTLY PRN
Status: DISCONTINUED | OUTPATIENT
Start: 2025-01-02 | End: 2025-01-06 | Stop reason: HOSPADM

## 2025-01-02 RX ORDER — HALOPERIDOL 5 MG/ML
10 INJECTION INTRAMUSCULAR EVERY 4 HOURS PRN
Status: DISCONTINUED | OUTPATIENT
Start: 2025-01-02 | End: 2025-01-06 | Stop reason: HOSPADM

## 2025-01-02 RX ORDER — DIPHENHYDRAMINE HYDROCHLORIDE 50 MG/ML
50 INJECTION INTRAMUSCULAR; INTRAVENOUS EVERY 4 HOURS PRN
Status: DISCONTINUED | OUTPATIENT
Start: 2025-01-02 | End: 2025-01-06 | Stop reason: HOSPADM

## 2025-01-02 RX ORDER — HALOPERIDOL 5 MG/1
10 TABLET ORAL EVERY 4 HOURS PRN
Status: DISCONTINUED | OUTPATIENT
Start: 2025-01-02 | End: 2025-01-06 | Stop reason: HOSPADM

## 2025-01-02 RX ORDER — DIPHENHYDRAMINE HCL 50 MG
50 CAPSULE ORAL EVERY 4 HOURS PRN
Status: DISCONTINUED | OUTPATIENT
Start: 2025-01-02 | End: 2025-01-06 | Stop reason: HOSPADM

## 2025-01-02 RX ORDER — OLANZAPINE 5 MG/1
10 TABLET ORAL NIGHTLY
Status: DISCONTINUED | OUTPATIENT
Start: 2025-01-02 | End: 2025-01-06 | Stop reason: HOSPADM

## 2025-01-02 RX ADMIN — TRAZODONE HYDROCHLORIDE 100 MG: 100 TABLET ORAL at 08:01

## 2025-01-02 RX ADMIN — HYDROXYZINE HYDROCHLORIDE 50 MG: 50 TABLET, FILM COATED ORAL at 11:01

## 2025-01-02 RX ADMIN — OLANZAPINE 10 MG: 5 TABLET, FILM COATED ORAL at 08:01
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
## 2025-01-02 NOTE — NURSING
At 1105, complaints of anxiety.  Anxiety level 6/10.  Atarax 50 mg., administered.  At 1205, verbalizes decreasing anxiety.  Anxiety level 4/10.

## 2025-01-02 NOTE — PLAN OF CARE
Problem: Adult Behavioral Health Plan of Care  Goal: Plan of Care Review  Outcome: Not Progressing  Flowsheets (Taken 1/2/2025 1206)  Patient Agreement with Plan of Care: agrees  Plan of Care Reviewed With: patient   Upon admission SW/CW reviewed pt care plan during psychosocial assessment and pt verbalized understanding of care plan.

## 2025-01-02 NOTE — H&P
[facility] - Behavioral Health Unit  History & Physical    Subjective:      Chief Complaint/Reason for Admission: schizophrenia with delusions     Raymond Quinn is a 44 y.o. male. Schizophrenia with delusions     No past medical history on file.  No past surgical history on file.  No family history on file.  Social History     Tobacco Use    Smoking status: Some Days     Types: Cigarettes    Smokeless tobacco: Never   Substance Use Topics    Alcohol use: Yes    Drug use: Not Currently       No medications prior to admission.     Review of patient's allergies indicates:  No Known Allergies     Review of Systems   Constitutional: Negative.    HENT: Negative.     Eyes: Negative.    Respiratory: Negative.     Cardiovascular: Negative.    Gastrointestinal: Negative.    Genitourinary: Negative.    Musculoskeletal: Negative.    Skin: Negative.    Neurological: Negative.    Endo/Heme/Allergies: Negative.    Psychiatric/Behavioral:  Positive for hallucinations and substance abuse. Negative for depression and suicidal ideas. The patient is nervous/anxious.        Objective:      Vital Signs (Most Recent)  Temp: 98.2 °F (36.8 °C) (01/02/25 1100)  Pulse: 101 (01/02/25 1100)  Resp: 18 (01/02/25 1100)  BP: (!) 142/99 (01/02/25 1100)  SpO2: 99 % (01/02/25 1100)    Vital Signs Range (Last 24H):  Temp:  [98.2 °F (36.8 °C)-98.5 °F (36.9 °C)]   Pulse:  []   Resp:  [16-18]   BP: (122-142)/(81-99)   SpO2:  [99 %]     Physical Exam  HENT:      Head: Normocephalic.      Right Ear: Tympanic membrane normal.      Left Ear: Tympanic membrane normal.      Nose: Nose normal.      Mouth/Throat:      Mouth: Mucous membranes are moist.   Eyes:      Extraocular Movements: Extraocular movements intact.      Pupils: Pupils are equal, round, and reactive to light.   Cardiovascular:      Rate and Rhythm: Normal rate and regular rhythm.   Pulmonary:      Effort: Pulmonary effort is normal.   Abdominal:      General: Abdomen is  flat.   Musculoskeletal:         General: Normal range of motion.   Skin:     General: Skin is warm.   Neurological:      General: No focal deficit present.      Mental Status: He is alert and oriented to person, place, and time.      Comments: Vision normal   Hearing normal   EOM intact   Face muscles normal  Facial sensation normal   Shrugs shoulders  Tongue midline            Data Review:    Recent Results (from the past 48 hours)   Comprehensive Metabolic Panel    Collection Time: 01/01/25 10:41 AM   Result Value Ref Range    Sodium 142 136 - 145 mmol/L    Potassium 4.3 3.5 - 5.1 mmol/L    Chloride 105 98 - 107 mmol/L    CO2 28 22 - 29 mmol/L    Glucose 103 (H) 74 - 100 mg/dL    Blood Urea Nitrogen 13.6 8.9 - 20.6 mg/dL    Creatinine 0.95 0.72 - 1.25 mg/dL    Calcium 10.2 8.4 - 10.2 mg/dL    Protein Total 7.3 6.4 - 8.3 gm/dL    Albumin 4.6 3.5 - 5.0 g/dL    Globulin 2.7 2.4 - 3.5 gm/dL    Albumin/Globulin Ratio 1.7 1.1 - 2.0 ratio    Bilirubin Total 0.5 <=1.5 mg/dL    ALP 81 40 - 150 unit/L    ALT 20 0 - 55 unit/L    AST 23 5 - 34 unit/L    eGFR >60 mL/min/1.73/m2    Anion Gap 9.0 mEq/L    BUN/Creatinine Ratio 14    Ethanol    Collection Time: 01/01/25 10:41 AM   Result Value Ref Range    Ethanol Level <10.0 <=10.0 mg/dL   CBC with Differential    Collection Time: 01/01/25 10:41 AM   Result Value Ref Range    WBC 11.98 (H) 4.50 - 11.50 x10(3)/mcL    RBC 5.08 4.70 - 6.10 x10(6)/mcL    Hgb 16.2 14.0 - 18.0 g/dL    Hct 47.3 42.0 - 52.0 %    MCV 93.1 80.0 - 94.0 fL    MCH 31.9 (H) 27.0 - 31.0 pg    MCHC 34.2 33.0 - 36.0 g/dL    RDW 11.9 11.5 - 17.0 %    Platelet 302 130 - 400 x10(3)/mcL    MPV 10.3 7.4 - 10.4 fL    Neut % 77.1 %    Lymph % 17.1 %    Mono % 4.7 %    Eos % 0.5 %    Basophil % 0.3 %    Lymph # 2.05 0.6 - 4.6 x10(3)/mcL    Neut # 9.24 (H) 2.1 - 9.2 x10(3)/mcL    Mono # 0.56 0.1 - 1.3 x10(3)/mcL    Eos # 0.06 0 - 0.9 x10(3)/mcL    Baso # 0.04 <=0.2 x10(3)/mcL    IG# 0.03 0 - 0.04 x10(3)/mcL    IG% 0.3 %     NRBC% 0.0 %   Drug Screen, Urine    Collection Time: 01/01/25 10:50 AM   Result Value Ref Range    Amphetamines, Urine Negative Negative    Barbiturates, Urine Negative Negative    Benzodiazepine, Urine Negative Negative    Cannabinoids, Urine Negative Negative    Cocaine, Urine Negative Negative    Fentanyl, Urine Positive (A) Negative    MDMA, Urine Negative Negative    Opiates, Urine Negative Negative    Phencyclidine, Urine Negative Negative    pH, Urine 6.5 3.0 - 11.0    Specific Gravity, Urine Auto 1.018 1.001 - 1.035   Urinalysis, Reflex to Urine Culture    Collection Time: 01/01/25 10:50 AM    Specimen: Urine, Clean Catch   Result Value Ref Range    Color, UA Light-Yellow Yellow, Light-Yellow, Colorless, Straw, Dark-Yellow    Appearance, UA Clear Clear    Specific Gravity, UA 1.018 1.005 - 1.030    pH, UA 6.5 5.0 - 8.5    Protein, UA Negative Negative    Glucose, UA Normal Negative, Normal    Ketones, UA Negative Negative    Blood, UA Negative Negative    Bilirubin, UA Negative Negative    Urobilinogen, UA Normal 0.2, 1.0, Normal    Nitrites, UA Negative Negative    Leukocyte Esterase, UA Negative Negative    RBC, UA 0-5 None Seen, 0-2, 3-5, 0-5 /HPF    WBC, UA None Seen None Seen, 0-2, 3-5, 0-5 /HPF    Bacteria, UA None Seen None Seen, Trace /HPF    Squamous Epithelial Cells, UA None Seen None Seen, Trace, Rare /HPF    Mucous, UA Trace (A) None Seen /LPF        No results found.       Assessment and Plan       Delusions   UDS positive for fentanyl

## 2025-01-02 NOTE — PLAN OF CARE
Problem: Adult Behavioral Health Plan of Care  Goal: Plan of Care Review  Outcome: Progressing  Flowsheets (Taken 1/2/2025 1035)  Patient Agreement with Plan of Care: agrees  Plan of Care Reviewed With: patient  Goal: Patient-Specific Goal (Individualization)  Outcome: Progressing  Flowsheets (Taken 1/2/2025 1035)  Patient Personal Strengths: independent living skills  Patient Vulnerabilities: substance abuse/addiction  Goal: Adheres to Safety Considerations for Self and Others  Outcome: Progressing  Flowsheets (Taken 1/2/2025 1035)  Adheres to Safety Considerations for Self and Others: making progress toward outcome  Intervention: Develop and Maintain Individualized Safety Plan  Flowsheets (Taken 1/2/2025 1035)  Safety Measures: safety rounds completed  Goal: Absence of New-Onset Illness or Injury  Outcome: Progressing  Intervention: Identify and Manage Fall Risk  Flowsheets (Taken 1/2/2025 1035)  Safety Measures: safety rounds completed  Intervention: Prevent VTE (Venous Thromboembolism)  Flowsheets (Taken 1/2/2025 1035)  VTE Prevention/Management: fluids promoted  Intervention: Prevent Infection  Flowsheets (Taken 1/2/2025 1035)  Infection Prevention: hand hygiene promoted  Goal: Optimized Coping Skills in Response to Life Stressors  Outcome: Progressing  Flowsheets (Taken 1/2/2025 1035)  Optimized Coping Skills in Response to Life Stressors: making progress toward outcome  Intervention: Promote Effective Coping Strategies  Flowsheets (Taken 1/2/2025 1035)  Supportive Measures: self-care encouraged  Goal: Develops/Participates in Therapeutic [place] to Support Successful Transition  Outcome: Progressing  Flowsheets (Taken 1/2/2025 1035)  Develops/Participates in Therapeutic [place] to Support Successful Transition: achieves outcome  Intervention: Foster Therapeutic [place]  Flowsheets (Taken 1/2/2025 1035)  Trust Relationship/Rapport: care explained  Goal: Rounds/Family Conference  Outcome:  Progressing  Flowsheets (Taken 1/2/2025 1035)  Participants: milieu/psych techs     Problem: Violence Risk or Actual  Goal: Anger and Impulse Control  Outcome: Progressing  Intervention: Minimize Safety Risk  Flowsheets (Taken 1/2/2025 1035)  Behavior Management: behavioral plan reviewed  Sensory Stimulation Regulation: quiet environment promoted  De-Escalation Techniques: quiet time facilitated  Intervention: Promote Self-Control  Flowsheets (Taken 1/2/2025 1035)  Supportive Measures: self-care encouraged  Environmental Support: calm environment promoted     Problem: Excessive Substance Use  Goal: Optimized Energy Level (Excessive Substance Use)  Outcome: Progressing  Flowsheets (Taken 1/2/2025 1035)  Mutually Determined Action Steps (Optimized Energy Level): grooms self without prompting  Intervention: Optimize Energy Level  Flowsheets (Taken 1/2/2025 1035)  Activity (Behavioral Health): up ad luis carlos  Patient Performed Hygiene: teeth brushed  Diversional Activity: television  Goal: Improved Behavioral Control (Excessive Substance Use)  Outcome: Progressing  Flowsheets (Taken 1/2/2025 1035)  Mutually Determined Action Steps (Improved Behavioral Control): verbalizes safe alternatives  Intervention: Promote Behavior and Impulse Control  Flowsheets (Taken 1/2/2025 1035)  Behavior Management: behavioral plan reviewed  Goal: Increased Participation and Engagement (Excessive Substance Use)  Outcome: Progressing  Flowsheets (Taken 1/2/2025 1035)  Mutually Determined Action Steps (Increased Participation and Engagement): identifies support resources  Intervention: Facilitate Participation and Engagement  Flowsheets (Taken 1/2/2025 1035)  Supportive Measures: self-care encouraged  Diversional Activity: television  Goal: Improved Physiologic Symptoms (Excessive Substance Use)  Outcome: Progressing  Flowsheets (Taken 1/2/2025 1035)  Mutually Determined Action Steps (Improved Physiologic Symptoms): discusses use  pattern  Intervention: Optimize Physiologic Function  Flowsheets (Taken 1/2/2025 1035)  Oral Nutrition Promotion: rest periods promoted  Nutrition Interventions: food preferences provided  Goal: Enhanced Social, Occupational or Functional Skills (Excessive Substance Use)  Outcome: Progressing  Flowsheets (Taken 1/2/2025 1035)  Mutually Determined Action Steps (Enhanced Social, Occupational or Functional Skills): identifies support resources  Intervention: Promote Social, Occupational and Functional Ability  Flowsheets (Taken 1/2/2025 1035)  Trust Relationship/Rapport: care explained  Social Functional Ability Promotion: autonomy promoted    He is AAO X 4. Anxious, irritable, and easily agitated. Labile moods noted. States he has a chip in his brain that was implanted by [employer]. Endorses that he has Schizophrenia, but then denies that he is Schizophrenic. Angry at times. Denies SI and HI. Guarded. Suspicious. Good eye contact noted. Speech normal tone and speed. Continue plan of care and provide a safe and therapeutic environment. Continue to monitor every fifteen minutes for safety.

## 2025-01-02 NOTE — H&P
"1/2/2025  Raymond Quinn   1980   [id number]            Psychiatry Inpatient Admission Note    Date of Admission: 1/1/2025  2:02 PM    Current Legal Status: Physician's Emergency Certificate    Chief Complaint: Hearing voices    SUBJECTIVE:   History of Present Illness:   Raymond Quinn is a 44-year-old male placed under a PEC at [facility] due to auditory hallucinations persisting for the past six months. According to ED records, the patient described recent revelations about his girlfriend, stating that she was not who he believed she was and that he had found the actual person he thought he was dating. 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, which he believes is floating in his head.    The patient reports a long history of hearing voices, presenting today as calm, redirectable, but exhibiting bizarre and grandiose thought content. He attributes the alleged brain chip to his sisters brother-in-law, whom he claims "injects pacemakers" and believes he performed this while the patient was at a hotel. The voices reportedly urge him to "turn himself in," but he denies any wrongdoing and engages in arguments with the voices.    The patient states that previous medications have been ineffective, leading to non-adherence post-hospital discharge. However, he voluntarily sought a medication adjustment and, after initial resistance, agreed to trial medication for his symptoms. He denies any current thoughts of self-harm or harm to others.    Zyprexa has been initiated, and the patient will be monitored closely for symptom improvement and medication adherence during hospitalization.    Past Psychiatric History:   Previous Psychiatric Hospitalizations: Multiple   Previous Medication Trials: Multiple unknown  Previous Suicide Attempts: Denies   Outpatient psychiatrist: Denies    Current Medications:   Home Psychiatric Meds: Noncompliant    Past Medical/Surgical History:   No past " medical history on file.  No past surgical history on file.      Family Psychiatric History:   Denies     Allergies:   Review of patient's allergies indicates:  No Known Allergies    Substance Abuse History:   Tobacco: 1 PPD  Alcohol: 1/2 Pint a day  Illicit Substances: Meth  Treatment: Outpatient        Scheduled Meds:    nicotine  1 patch Transdermal Daily      PRN Meds:   Current Facility-Administered Medications:     acetaminophen, 650 mg, Oral, Q6H PRN    aluminum-magnesium hydroxide-simethicone, 30 mL, Oral, Q6H PRN    diphenhydrAMINE, 50 mg, Oral, Q4H PRN    diphenhydrAMINE, 50 mg, Intramuscular, Q4H PRN    haloperidol lactate, 10 mg, Intramuscular, Q4H PRN    haloperidoL, 10 mg, Oral, Q4H PRN    hydrOXYzine HCL, 50 mg, Oral, Q4H PRN    lorazepam, 2 mg, Intramuscular, Q4H PRN    LORazepam, 2 mg, Oral, Q4H PRN    traZODone, 100 mg, Oral, Nightly PRN   Psychotherapeutics (From admission, onward)      Start     Stop Route Frequency Ordered    01/02/25 0208  LORazepam tablet 2 mg         -- Oral Every 4 hours PRN 01/02/25 0209    01/02/25 0208  haloperidoL tablet 10 mg         -- Oral Every 4 hours PRN 01/02/25 0209    01/02/25 0208  LORazepam injection 2 mg         -- IM Every 4 hours PRN 01/02/25 0209    01/02/25 0208  haloperidol lactate injection 10 mg         -- IM Every 4 hours PRN 01/02/25 0209    01/02/25 0208  traZODone tablet 100 mg         -- Oral Nightly PRN 01/02/25 0209              Social History:  Housing Status: Homeless  Relationship Status/Sexual Orientation: Single   Children: One  Education: high school   Employment Status/Info: Unemployed    history: Denies  History of physical/sexual abuse: Denies   Access to gun: Denies       Legal History:   Past Charges/Incarcerations: Nothing to talk about   Pending charges: Trial deversion      OBJECTIVE:   Medical Review Of Systems:  A comprehensive review of systems was negative.    Vitals   Vitals:    01/02/25 0701   BP: 122/81   Pulse: 105    Resp: 18   Temp: 98.2 °F (36.8 °C)        Labs/Imaging/Studies:   Recent Results (from the past 48 hours)   Comprehensive Metabolic Panel    Collection Time: 01/01/25 10:41 AM   Result Value Ref Range    Sodium 142 136 - 145 mmol/L    Potassium 4.3 3.5 - 5.1 mmol/L    Chloride 105 98 - 107 mmol/L    CO2 28 22 - 29 mmol/L    Glucose 103 (H) 74 - 100 mg/dL    Blood Urea Nitrogen 13.6 8.9 - 20.6 mg/dL    Creatinine 0.95 0.72 - 1.25 mg/dL    Calcium 10.2 8.4 - 10.2 mg/dL    Protein Total 7.3 6.4 - 8.3 gm/dL    Albumin 4.6 3.5 - 5.0 g/dL    Globulin 2.7 2.4 - 3.5 gm/dL    Albumin/Globulin Ratio 1.7 1.1 - 2.0 ratio    Bilirubin Total 0.5 <=1.5 mg/dL    ALP 81 40 - 150 unit/L    ALT 20 0 - 55 unit/L    AST 23 5 - 34 unit/L    eGFR >60 mL/min/1.73/m2    Anion Gap 9.0 mEq/L    BUN/Creatinine Ratio 14    Ethanol    Collection Time: 01/01/25 10:41 AM   Result Value Ref Range    Ethanol Level <10.0 <=10.0 mg/dL   CBC with Differential    Collection Time: 01/01/25 10:41 AM   Result Value Ref Range    WBC 11.98 (H) 4.50 - 11.50 x10(3)/mcL    RBC 5.08 4.70 - 6.10 x10(6)/mcL    Hgb 16.2 14.0 - 18.0 g/dL    Hct 47.3 42.0 - 52.0 %    MCV 93.1 80.0 - 94.0 fL    MCH 31.9 (H) 27.0 - 31.0 pg    MCHC 34.2 33.0 - 36.0 g/dL    RDW 11.9 11.5 - 17.0 %    Platelet 302 130 - 400 x10(3)/mcL    MPV 10.3 7.4 - 10.4 fL    Neut % 77.1 %    Lymph % 17.1 %    Mono % 4.7 %    Eos % 0.5 %    Basophil % 0.3 %    Lymph # 2.05 0.6 - 4.6 x10(3)/mcL    Neut # 9.24 (H) 2.1 - 9.2 x10(3)/mcL    Mono # 0.56 0.1 - 1.3 x10(3)/mcL    Eos # 0.06 0 - 0.9 x10(3)/mcL    Baso # 0.04 <=0.2 x10(3)/mcL    IG# 0.03 0 - 0.04 x10(3)/mcL    IG% 0.3 %    NRBC% 0.0 %   Drug Screen, Urine    Collection Time: 01/01/25 10:50 AM   Result Value Ref Range    Amphetamines, Urine Negative Negative    Barbiturates, Urine Negative Negative    Benzodiazepine, Urine Negative Negative    Cannabinoids, Urine Negative Negative    Cocaine, Urine Negative Negative    Fentanyl, Urine Positive  "(A) Negative    MDMA, Urine Negative Negative    Opiates, Urine Negative Negative    Phencyclidine, Urine Negative Negative    pH, Urine 6.5 3.0 - 11.0    Specific Gravity, Urine Auto 1.018 1.001 - 1.035   Urinalysis, Reflex to Urine Culture    Collection Time: 01/01/25 10:50 AM    Specimen: Urine, Clean Catch   Result Value Ref Range    Color, UA Light-Yellow Yellow, Light-Yellow, Colorless, Straw, Dark-Yellow    Appearance, UA Clear Clear    Specific Gravity, UA 1.018 1.005 - 1.030    pH, UA 6.5 5.0 - 8.5    Protein, UA Negative Negative    Glucose, UA Normal Negative, Normal    Ketones, UA Negative Negative    Blood, UA Negative Negative    Bilirubin, UA Negative Negative    Urobilinogen, UA Normal 0.2, 1.0, Normal    Nitrites, UA Negative Negative    Leukocyte Esterase, UA Negative Negative    RBC, UA 0-5 None Seen, 0-2, 3-5, 0-5 /HPF    WBC, UA None Seen None Seen, 0-2, 3-5, 0-5 /HPF    Bacteria, UA None Seen None Seen, Trace /HPF    Squamous Epithelial Cells, UA None Seen None Seen, Trace, Rare /HPF    Mucous, UA Trace (A) None Seen /LPF      No results found for: "PHENYTOIN", "PHENOBARB", "VALPROATE", "CBMZ"        Psychiatric Mental Status Exam:  General Appearance: appears stated age, well developed and nourished, adequately groomed and appropriately dressed, in no acute distress  Arousal: alert  Behavior: appropriate eye-contact, under good behavioral control, redirectable  Movements and Motor Activity: no abnormal involuntary movements noted; no tics, no tremors, no akathisia, no dystonia, no evidence of tardive dyskinesia; no psychomotor agitation or retardation  Orientation: intact; oriented fully to person, place, time and situation  Speech: intact; normal rate, rhythm, volume, tone and pitch; conversational, spontaneous, and coherent  Mood: Irritable and Dysphoric  Affect: irritable  Thought Process: intact, linear, goal-directed, organized, logical  Associations: intact, no loosening of " associations  Thought Content and Perceptions: no suicidal ideation, no homicidal ideation, + auditory hallucinations, + command hallucinations, + paranoid ideation  Recent and Remote Memory: intact; per interview/observation with patient  Attention and Concentration: intact; per interview/observation with patient  Fund of Knowledge: intact; based on history, vocabulary, fund of knowledge, syntax, grammar, and content  Insight: questionable; based on understanding of severity of illness and HPI  Judgment: questionable; based on patient's behavior and HPI      Patient Strengths:  Access to care, Able to verbalize needs, Stable physical health, and Motivation for treatment      Patient Liabilities:  Medication non-compliance, Substance use, Psychosis, and Chronic psychiatric illness      Discharge Criteria:  Improved mood, Improved thought process, Medication compliance, Improved coping skills, and Decreased anxiety      Reason for Admission:  The patient poses a significant and immediate danger to self., The patient poses a significant and immediate danger to others due to a psychiatric condition., To stabilize an acute exacerbation of a severe persistent mental disorder., and The patient has failed to make sufficient clinical gains within a traditional outpatient setting and severity of presenting symptoms requires inpatient treatment.    ASSESSMENT/PLAN:   Diagnoses:  SUBSTANCE-RELATED DISORDERS; Amphetamine Related Disorders; Amphetamine Abuse  and Opioid-Related Disorders; 4.9.1.2.Opioid Abuse (F11.10) and SCHIZOPHRENIA AND OTHER PSYCHOTIC DISORDERS; Psychotic Disorder NOS  F29        No past medical history on file.       Problem lists and Management Plans:  -Admit to [facility]    Psychosis  - Zyprexa 10 mg qhs  -Will attempt to obtain outside psychiatric records if available  - to assist with aftercare planning and collateral  -Continue inpatient treatment as evidenced by hallucinations      Estimated  length of stay: 5-7    Estimated Disposition: Substance treatment program    Estimated Follow-up: Outpatient medication management and Substance treatment program          Dario Aguilera [id number]

## 2025-01-02 NOTE — GROUP NOTE
Group Psychotherapy       Group Focus: Stress Management      Number of patients in attendance: 6    Group topic: Coping skills Therapist explored patients need for increase effective coping skills to deal with anxiety.           Group Start Time: 1205  Group End Time:  1250  Groups Date: 1/2/2025  Group Topic:  Behavioral Health  Group Department: [facility]  Group Facilitators:  Georgiana Allison SSW   _____________________________________________________________________    Patient Name: Raymond Quinn  MRN: [id number]  Patient Class: IP- Psych   Admission Date\Time: 1/1/2025  2:02 PM  Hospital Length of Stay: 1  Primary Care Provider: Zoe, Primary Doctor     Referred by: Acute Psychiatry Unit Treatment Team     Target symptoms: Psychosis     Patient's response to treatment: Patient was not present for group session, Alternative was provided      Progress toward goals: Minimal progress          Plan: Continue treatment on SAFIA Allison LCSW-BACS

## 2025-01-02 NOTE — CARE UPDATE
Pt was noted in room actively talking to self. Pt in room alone and overheard talking about the [facility] and how it is a [facility] place and heard stating, How are you going to get me now.

## 2025-01-02 NOTE — PROGRESS NOTES
01/02/25 1000   [facility] Group Therapy   Group Name Therapeutic Recreation   Specific Interventions Skilled Activity Mild Exercises   Participation Level None   Participation Quality Conflict w/ Other;Services

## 2025-01-02 NOTE — PROGRESS NOTES
Raymond is a 43y/o male admitted for Psychotic Disorder NOS F29, Amphetamine Abuse, and Opioid-Related Disorders; 4.9.1.2.Opioid Abuse (F11.10) with a uds +fentanyl. CTRS met with Pt 1:1, Raymond was cooperative but guarded, reporting admission as Schizophrenic and problems with my ex and ability to perform his ADL's. CTRS educated Pt to TR groups times and dates with Raymond agreeing to attend TR groups. Raymond reported his treatment goal as Sobriety.   01/02/25 1343   General   Admit Date 01/01/25   Primary Diagnosis Psychotic Disorder NOS F29   Secondary Diagnosis Amphetamine Abuse, and Opioid-Related Disorders; 4.9.1.2.Opioid Abuse (F11.10)   Jain Druze   Number of Children 1   Children Living? 1   Occupation unemployed   Does the patient have dentures? No   If you were to take part in activities, which of the following would you prefer? Both   Do you feel like you have enough to keep you busy now? Yes   Do you believe that you have the opportunity for physical activity? Yes   Activity Capabilities Maximum   Subjective   Patient states Schizophrenic and problems with my ex   Assessment   Mobility ambulates independently   Transfers independently   Musculoskeletal   (none reported)   Visual Acuity normal vision   Visual Perception depth perception;color perception;recognizes letters;recognizes numbers   Hearing normal   Speech/Communication normal   Cognitive Concerns oriented x4   Emotional Concerns   (appeared dyschoric)   Leisure Interest Survey   Leisure Interest Survey Yes   Physical Activities   Baseball/Softball Current Interest   Walk/Run Current Interest   Other Physical Activity   (playing frisbee)   Creative Activities   Wood Working Current Interest   Cooking Current Interest   Other Creative Activity   (metal working)   Outdoor Activities   Bicycling Current Interest   Fishing Current Interest   Camping Current Interest   Skiing Current Interest   Water Sports Current Interest   PlaySay  Current Interest   Goals   Additional Documentation yes   Goal Formulation With patient   Time For Goal Achievement 7 days   Goal 1 Sobriety   Goal 1-Progress Ongoing- slowly progressing,   Plan   Planned Therapy Intervention Group Recreational Therapy   Expected Length of Stay 5-7days   PT Frequency Minimum of 3 visits per week

## 2025-01-03 PROCEDURE — 25000003 PHARM REV CODE 250: Performed by: PSYCHIATRY & NEUROLOGY

## 2025-01-03 PROCEDURE — 11400000 HC PSYCH PRIVATE ROOM

## 2025-01-03 RX ADMIN — HYDROXYZINE HYDROCHLORIDE 50 MG: 50 TABLET, FILM COATED ORAL at 07:01

## 2025-01-03 RX ADMIN — HYDROXYZINE HYDROCHLORIDE 50 MG: 50 TABLET, FILM COATED ORAL at 04:01

## 2025-01-03 NOTE — PROGRESS NOTES
Refused despite encouragement, Alternative materials were offered.    01/03/25 1000   [facility] Group Therapy   Group Name Therapeutic Recreation   Specific Interventions Skilled Activity Mild Exercises   Participation Level None   Participation Quality Refused

## 2025-01-03 NOTE — NURSING
At 0737, complaints of anxiety.  Anxiety level 7/10.  Atarax 50 mg., administered.  At 0837, verbalizes decreasing anxiety.  Anxiety level 4/10.

## 2025-01-03 NOTE — NURSING
At 1620, complaints of anxiety, anxiety level 4/10.  Atarax 50 mg., administered.  At 1650, verbalizes no relief from anxiety.

## 2025-01-03 NOTE — PLAN OF CARE
Problem: Adult Behavioral Health Plan of Care  Goal: Plan of Care Review  Outcome: Progressing  Flowsheets (Taken 1/2/2025 2037)  Patient Agreement with Plan of Care: agrees  Plan of Care Reviewed With: patient  Goal: Patient-Specific Goal (Individualization)  Outcome: Progressing  Flowsheets (Taken 1/2/2025 2037)  Patient Personal Strengths: independent living skills  Patient Vulnerabilities: substance abuse/addiction  Goal: Adheres to Safety Considerations for Self and Others  Outcome: Progressing  Flowsheets (Taken 1/2/2025 2037)  Adheres to Safety Considerations for Self and Others: making progress toward outcome  Intervention: Develop and Maintain Individualized Safety Plan  Flowsheets (Taken 1/2/2025 2037)  Safety Measures:   safety plan reviewed   safety rounds completed  Goal: Optimized Coping Skills in Response to Life Stressors  Outcome: Progressing  Flowsheets (Taken 1/2/2025 2037)  Optimized Coping Skills in Response to Life Stressors: making progress toward outcome  Intervention: Promote Effective Coping Strategies  Flowsheets (Taken 1/2/2025 2037)  Supportive Measures:   active listening utilized   verbalization of feelings encouraged   self-care encouraged  Goal: Develops/Participates in Therapeutic [place] to Support Successful Transition  Outcome: Progressing  Flowsheets (Taken 1/2/2025 2037)  Develops/Participates in Therapeutic [place] to Support Successful Transition: making progress toward outcome  Intervention: Foster Therapeutic [place]  Flowsheets (Taken 1/2/2025 2037)  Trust Relationship/Rapport:   care explained   questions encouraged   reassurance provided  Goal: Rounds/Family Conference  Outcome: Progressing  Flowsheets (Taken 1/2/2025 2037)  Participants:   milieu/psych techs   nursing     Problem: Violence Risk or Actual  Goal: Anger and Impulse Control  Outcome: Progressing  Intervention: Minimize Safety Risk  Flowsheets (Taken 1/2/2025 2037)  Behavior Management: behavioral plan  reviewed  Sensory Stimulation Regulation: quiet environment promoted  De-Escalation Techniques: quiet time facilitated  Enhanced Safety Measures: monitored by video  Intervention: Promote Self-Control  Flowsheets (Taken 1/2/2025 2037)  Supportive Measures:   active listening utilized   verbalization of feelings encouraged   self-care encouraged  Environmental Support: calm environment promoted     Problem: Excessive Substance Use  Goal: Optimized Energy Level (Excessive Substance Use)  Outcome: Progressing  Flowsheets (Taken 1/2/2025 2037)  Mutually Determined Action Steps (Optimized Energy Level): grooms self without prompting  Intervention: Optimize Energy Level  Flowsheets (Taken 1/2/2025 2037)  Activity (Behavioral Health): up ad luis carlos  Diversional Activity: television  Goal: Improved Behavioral Control (Excessive Substance Use)  Outcome: Progressing  Flowsheets (Taken 1/2/2025 2037)  Mutually Determined Action Steps (Improved Behavioral Control): verbalizes safe alternatives  Intervention: Promote Behavior and Impulse Control  Flowsheets (Taken 1/2/2025 2037)  Behavior Management: behavioral plan reviewed  Goal: Increased Participation and Engagement (Excessive Substance Use)  Outcome: Progressing  Flowsheets (Taken 1/2/2025 2037)  Mutually Determined Action Steps (Increased Participation and Engagement): discusses ongoing recovery plan  Intervention: Facilitate Participation and Engagement  Flowsheets (Taken 1/2/2025 2037)  Supportive Measures:   active listening utilized   verbalization of feelings encouraged   self-care encouraged  Diversional Activity: television  Goal: Improved Physiologic Symptoms (Excessive Substance Use)  Outcome: Progressing  Flowsheets (Taken 1/2/2025 2037)  Mutually Determined Action Steps (Improved Physiologic Symptoms): discusses use pattern  Intervention: Optimize Physiologic Function  Flowsheets (Taken 1/2/2025 2037)  Oral Nutrition Promotion:   social interaction promoted    "physical activity promoted  Goal: Enhanced Social, Occupational or Functional Skills (Excessive Substance Use)  Outcome: Progressing  Intervention: Promote Social, Occupational and Functional Ability  Flowsheets (Taken 1/2/2025 2037)  Trust Relationship/Rapport:   care explained   questions encouraged   reassurance provided  Social Functional Ability Promotion:   autonomy promoted   self-expression encouraged       AAOx4 Pt denies SI/HI/AVH. Pt endorses having anxiety and depression, reports poor sleep and good appetite. Pt can be irritable, is paranoid and delusional regarding "chip in his brain', pt reports on-going non-command auditory hallucinations. Pt has poor eye contact and affect is flat. Pt is withdrawn and isolative to room. Q15 min safety checks continued.   "

## 2025-01-03 NOTE — PLAN OF CARE
Problem: Adult Behavioral Health Plan of Care  Goal: Plan of Care Review  Outcome: Progressing  Flowsheets (Taken 1/3/2025 1051)  Patient Agreement with Plan of Care: agrees  Plan of Care Reviewed With: patient  Goal: Patient-Specific Goal (Individualization)  Outcome: Progressing  Flowsheets (Taken 1/3/2025 1051)  Patient Personal Strengths: independent living skills  Patient Vulnerabilities: substance abuse/addiction  Goal: Adheres to Safety Considerations for Self and Others  Outcome: Progressing  Flowsheets (Taken 1/3/2025 1051)  Adheres to Safety Considerations for Self and Others: making progress toward outcome  Intervention: Develop and Maintain Individualized Safety Plan  Flowsheets (Taken 1/3/2025 1051)  Safety Measures: safety rounds completed  Goal: Optimized Coping Skills in Response to Life Stressors  Outcome: Progressing  Flowsheets (Taken 1/3/2025 1051)  Optimized Coping Skills in Response to Life Stressors: making progress toward outcome  Intervention: Promote Effective Coping Strategies  Flowsheets (Taken 1/3/2025 1051)  Supportive Measures: active listening utilized  Goal: Develops/Participates in Therapeutic [place] to Support Successful Transition  Outcome: Progressing  Flowsheets (Taken 1/3/2025 1051)  Develops/Participates in Therapeutic [place] to Support Successful Transition: making progress toward outcome  Intervention: Foster Therapeutic [place]  Flowsheets (Taken 1/3/2025 1051)  Trust Relationship/Rapport: care explained  Goal: Rounds/Family Conference  Outcome: Progressing  Flowsheets (Taken 1/3/2025 1051)  Participants:   milieu/psych techs   nursing     Problem: Violence Risk or Actual  Goal: Anger and Impulse Control  Outcome: Progressing  Intervention: Minimize Safety Risk  Flowsheets (Taken 1/3/2025 1051)  Behavior Management: behavioral plan reviewed  Sensory Stimulation Regulation: quiet environment promoted  De-Escalation Techniques: quiet time facilitated  Intervention:  Promote Self-Control  Flowsheets (Taken 1/3/2025 1051)  Supportive Measures: active listening utilized  Environmental Support: calm environment promoted     Problem: Excessive Substance Use  Goal: Optimized Energy Level (Excessive Substance Use)  Outcome: Progressing  Flowsheets (Taken 1/3/2025 1051)  Mutually Determined Action Steps (Optimized Energy Level): grooms self without prompting  Intervention: Optimize Energy Level  Flowsheets (Taken 1/3/2025 1051)  Activity (Behavioral Health): up ad luis carlos  Patient Performed Hygiene: teeth brushed  Diversional Activity: television  Goal: Improved Behavioral Control (Excessive Substance Use)  Outcome: Progressing  Flowsheets (Taken 1/3/2025 1051)  Mutually Determined Action Steps (Improved Behavioral Control): identifies major stressors  Intervention: Promote Behavior and Impulse Control  Flowsheets (Taken 1/3/2025 1051)  Behavior Management: behavioral plan reviewed  Goal: Increased Participation and Engagement (Excessive Substance Use)  Outcome: Progressing  Flowsheets (Taken 1/3/2025 1051)  Mutually Determined Action Steps (Increased Participation and Engagement): discusses ongoing recovery plan  Intervention: Facilitate Participation and Engagement  Flowsheets (Taken 1/3/2025 1051)  Supportive Measures: active listening utilized  Diversional Activity: television  Goal: Improved Physiologic Symptoms (Excessive Substance Use)  Outcome: Progressing  Flowsheets (Taken 1/3/2025 1051)  Mutually Determined Action Steps (Improved Physiologic Symptoms): discusses use pattern  Intervention: Optimize Physiologic Function  Flowsheets (Taken 1/3/2025 1051)  Oral Nutrition Promotion: rest periods promoted  Nutrition Interventions: food preferences provided  Goal: Enhanced Social, Occupational or Functional Skills (Excessive Substance Use)  Outcome: Progressing  Flowsheets (Taken 1/3/2025 1051)  Mutually Determined Action Steps (Enhanced Social, Occupational or Functional Skills):  identifies support resources  Intervention: Promote Social, Occupational and Functional Ability  Flowsheets (Taken 1/3/2025 1051)  Trust Relationship/Rapport: care explained  Social Functional Ability Promotion: autonomy promoted    He is AAO X 4. Denies SI, HI, hallucinations, and delusions. States he was accepted at [facility] in [place], [place]. Good eye contact noted. Speech normal tone and speed. Medication compliant. Interacts with selected patients and staff. Continue plan of care and provide a safe and therapeutic environment. Continue to monitor every fifteen minutes for safety.

## 2025-01-03 NOTE — PROGRESS NOTES
Refused despite encouragement, Alternative materials were offered.    01/03/25 1400   [facility] Group Therapy   Group Name Therapeutic Recreation   Specific Interventions Skilled Activity Leisure Education and Awareness   Participation Level None   Participation Quality Refused

## 2025-01-03 NOTE — NURSING
Patient requested medication for sleep. Trazodone administered as ordered per MD. Will monitor for efficacy.     2120- medication effective

## 2025-01-03 NOTE — PROGRESS NOTES
"1/3/2025  Raymond Quinn   1980   [id number]        Psychiatry Progress Note     Chief Complaint: Alright    SUBJECTIVE:   Raymond Quinn is a 44 y.o. male placed under a PEC at [facility] [place] due to auditory hallucinations persisting for the past six months. According to ED records, the patient described recent revelations about his girlfriend, stating that she was not who he believed she was and that he had found the actual person he thought he was dating. 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, which he believes is floating in his head.     Today patient presents calm and cooperative. Patient states that he is feeling "alright". Patient di not endorse any bizarre behavior during this exam. Staff reported that he was continuing to express thoughts of the chip in his brain last night. Endorses that he is eating and sleeping appropriately. Tolerating medication well without issue. Will conitnue the current regimen and monitor for need to augment.        Current Medications:   Scheduled Meds:    nicotine  1 patch Transdermal Daily    OLANZapine  10 mg Oral QHS      PRN Meds:   Current Facility-Administered Medications:     acetaminophen, 650 mg, Oral, Q6H PRN    aluminum-magnesium hydroxide-simethicone, 30 mL, Oral, Q6H PRN    diphenhydrAMINE, 50 mg, Oral, Q4H PRN    diphenhydrAMINE, 50 mg, Intramuscular, Q4H PRN    haloperidol lactate, 10 mg, Intramuscular, Q4H PRN    haloperidoL, 10 mg, Oral, Q4H PRN    hydrOXYzine HCL, 50 mg, Oral, Q4H PRN    lorazepam, 2 mg, Intramuscular, Q4H PRN    LORazepam, 2 mg, Oral, Q4H PRN    traZODone, 100 mg, Oral, Nightly PRN   Psychotherapeutics (From admission, onward)      Start     Stop Route Frequency Ordered    01/02/25 2100  OLANZapine tablet 10 mg         -- Oral Nightly 01/02/25 1053    01/02/25 0208  LORazepam tablet 2 mg         -- Oral Every 4 hours PRN 01/02/25 0209    01/02/25 0208  haloperidoL tablet 10 mg         -- Oral Every 4 " hours PRN 01/02/25 0209    01/02/25 0208  LORazepam injection 2 mg         -- IM Every 4 hours PRN 01/02/25 0209    01/02/25 0208  haloperidol lactate injection 10 mg         -- IM Every 4 hours PRN 01/02/25 0209    01/02/25 0208  traZODone tablet 100 mg         -- Oral Nightly PRN 01/02/25 0209            Allergies:   Review of patient's allergies indicates:  No Known Allergies     OBJECTIVE:   Vitals   Vitals:    01/03/25 0701   BP: 106/75   Pulse: 89   Resp: 17   Temp: 97.5 °F (36.4 °C)        Labs/Imaging/Studies:   No results found for this or any previous visit (from the past 36 hours).       Medical Review Of Systems:  A comprehensive review of systems was negative.      Psychiatric Mental Status Exam:  General Appearance: appears stated age, well developed and nourished, adequately groomed and appropriately dressed, in no acute distress  Arousal: alert  Behavior: appropriate eye-contact, under good behavioral control, redirectable  Movements and Motor Activity: no abnormal involuntary movements noted; no tics, no tremors, no akathisia, no dystonia, no evidence of tardive dyskinesia; no psychomotor agitation or retardation  Orientation: intact; oriented fully to person, place, time and situation  Speech: intact; normal rate, rhythm, volume, tone and pitch; conversational, spontaneous, and coherent  Mood: Irritable and Dysphoric  Affect: irritable  Thought Process: intact, linear, goal-directed, organized, logical  Associations: intact, no loosening of associations  Thought Content and Perceptions: no suicidal ideation, no homicidal ideation, + auditory hallucinations, + command hallucinations, + paranoid ideation  Recent and Remote Memory: intact; per interview/observation with patient  Attention and Concentration: intact; per interview/observation with patient  Fund of Knowledge: intact; based on history, vocabulary, fund of knowledge, syntax, grammar, and content  Insight: questionable; based on  understanding of severity of illness and HPI  Judgment: questionable; based on patient's behavior and HPI    ASSESSMENT/PLAN:   Problems Addressed/Diagnoses:  Psychotic Disorder NOS  F29  Amphetamine Related Disorders; Amphetamine Abuse  Opioid Abuse (F11.10)      No past medical history on file.     Plan:  Psychosis  - Zyprexa 10 mg qhs    Expected Disposition Plan: Substance treatment program        Dario Aguilera [id number]

## 2025-01-03 NOTE — PROGRESS NOTES
Aromatherapy, Sound Therapy, and Relaxation Education group Co-facilitated with Kala Carbajal LPN  Refused despite encouragement, Alternative materials were offered.    01/02/25 1500   [facility] Group Therapy   Group Name Therapeutic Recreation   Specific Interventions Relaxation Training   Participation Level None   Participation Quality Refused

## 2025-01-03 NOTE — PROGRESS NOTES
Inpatient Nutrition Assessment    Admit Date: 1/1/2025   Total duration of encounter: 2 days   Patient Age: 44 y.o.    Nutrition Recommendation/Prescription     Continue Regular diet as tolerated  Consider daily MVI  Monitor po intake, wt, labs    Communication of Recommendations:  EMR    Nutrition Assessment     Malnutrition Assessment/Nutrition-Focused Physical Exam       Malnutrition Level: other (see comments) (Unable to assess) (01/03/25 1528)  Energy Intake (Malnutrition): other (see comments) (Unable to assess) (01/03/25 1528)  Weight Loss (Malnutrition): 5% in 1 month (01/03/25 1528)                                                  A minimum of two characteristics is recommended for diagnosis of either severe or non-severe malnutrition.    Chart Review    Reason Seen: malnutrition screening tool (MST)    Malnutrition Screening Tool Results   Have you recently lost weight without trying?: Unsure  Have you been eating poorly because of a decreased appetite?: No   MST Score: 2     Diagnosis: Psychotic Disorder NOS   Amphetamine Related Disorders; Amphetamine Abuse  Opioid Abuse    Relevant Medical History: No past medical history on file     Scheduled Medications:  nicotine, 1 patch, Daily  OLANZapine, 10 mg, QHS    Continuous Infusions:   PRN Medications:  acetaminophen, 650 mg, Q6H PRN  aluminum-magnesium hydroxide-simethicone, 30 mL, Q6H PRN  diphenhydrAMINE, 50 mg, Q4H PRN  diphenhydrAMINE, 50 mg, Q4H PRN  haloperidol lactate, 10 mg, Q4H PRN  haloperidoL, 10 mg, Q4H PRN  hydrOXYzine HCL, 50 mg, Q4H PRN  lorazepam, 2 mg, Q4H PRN  LORazepam, 2 mg, Q4H PRN  traZODone, 100 mg, Nightly PRN    Calorie Containing IV Medications: no significant kcals from medications at this time    Recent Labs   Lab 01/01/25  1041      K 4.3   CALCIUM 10.2      CO2 28   BUN 13.6   CREATININE 0.95   EGFRNORACEVR >60   GLUCOSE 103*   BILITOT 0.5   ALKPHOS 81   ALT 20   AST 23   ALBUMIN 4.6   WBC 11.98*   HGB 16.2   HCT  47.3     Nutrition Orders:  Diet Adult Regular      Appetite/Oral Intake: good/% of meals  Factors Affecting Nutritional Intake: none identified  Social Needs Impacting Access to Food: unable to assess at this time; will attempt on follow-up  Food/Religion/Cultural Preferences: unable to obtain  Food Allergies: unable to obtain     Wound(s):  None noted    Comments  1/3: Unable to visit pt at this time. Attempted to contact nsg to determine if pt appropriate to speak with; unable to make contact. Pt noted with good po intake; consuming 100% most meals. Unable to obtain intake hx; pt noted with ~10% wt loss over the past 1-2 months per EMR wt hx (UBW ~170#-175#). Unable to complete full NFPE; will attempt on f/up if able. No recent labs.      Anthropometrics    Height: 6' (182.9 cm),    Last Weight: 71.7 kg (158 lb) (01/01/25 1522), Weight Method: Standard Scale  BMI (Calculated): 21.4  BMI Classification: normal (BMI 18.5-24.9)        Ideal Body Weight (IBW), Male: 178 lb     % Ideal Body Weight, Male (lb): 88.76 %                 Usual Body Weight (UBW), kg:  (Unable to obtain)        Usual Weight Provided By: EMR weight history and unable to obtain usual weight    Wt Readings from Last 5 Encounters:   01/01/25 71.7 kg (158 lb)   01/01/25 79.4 kg (175 lb)   12/25/24 71.5 kg (157 lb 10.1 oz)   11/30/24 79.4 kg (175 lb)   05/28/23 77.8 kg (171 lb 8.3 oz)     Weight Change(s) Since Admission:   Wt Readings from Last 1 Encounters:   01/01/25 1522 71.7 kg (158 lb)   Admit Weight: 71.7 kg (158 lb) (01/01/25 1522), Weight Method: Standard Scale    Estimated Needs    Weight Used For Calorie Calculations: 71.7 kg (158 lb 1.1 oz)  Energy Calorie Requirements (kcal): 2151 kcal (30 kcal/kg)  Energy Need Method: Kcal/kg  Weight Used For Protein Calculations: 71.7 kg (158 lb 1.1 oz)  Protein Requirements: 72g (1.0 g/kg)  Fluid Requirements (mL): 2151mL or per MD (1mL/kcal)        Enteral Nutrition     Patient not  receiving enteral nutrition at this time.    Parenteral Nutrition     Patient not receiving parenteral nutrition support at this time.    Evaluation of Received Nutrient Intake    Calories: meeting estimated needs  Protein: meeting estimated needs    Patient Education     Not applicable.    Nutrition Diagnosis     PES: Predicted inadequate energy intake related to suboptimal protein/energy intake as evidenced by ~10% wt loss over 1-2 months per EMR. (new)     PES:            Nutrition Interventions     Intervention(s): general/healthful diet, commercial beverage, multivitamin/mineral supplement therapy, and collaboration with other providers  Intervention(s):      Goal: Meet greater than 80% of nutritional needs by follow-up. (new)  Goal: Maintain weight throughout hospitalization. (new)    Nutrition Goals & Monitoring     Dietitian will monitor: food and beverage intake and weight  Discharge planning: continue Regular diet  Nutrition Risk/Follow-Up: low (follow-up in 5-7 days)   Please consult if re-assessment needed sooner.

## 2025-01-04 PROCEDURE — S4991 NICOTINE PATCH NONLEGEND: HCPCS | Performed by: PSYCHIATRY & NEUROLOGY

## 2025-01-04 PROCEDURE — 25000003 PHARM REV CODE 250: Performed by: PSYCHIATRY & NEUROLOGY

## 2025-01-04 PROCEDURE — 25000003 PHARM REV CODE 250

## 2025-01-04 PROCEDURE — 11400000 HC PSYCH PRIVATE ROOM

## 2025-01-04 RX ADMIN — NICOTINE 1 PATCH: 21 PATCH, EXTENDED RELEASE TRANSDERMAL at 07:01

## 2025-01-04 RX ADMIN — OLANZAPINE 10 MG: 5 TABLET, FILM COATED ORAL at 08:01

## 2025-01-04 RX ADMIN — TRAZODONE HYDROCHLORIDE 100 MG: 100 TABLET ORAL at 08:01

## 2025-01-04 RX ADMIN — HYDROXYZINE HYDROCHLORIDE 50 MG: 50 TABLET, FILM COATED ORAL at 01:01

## 2025-01-04 RX ADMIN — HYDROXYZINE HYDROCHLORIDE 50 MG: 50 TABLET, FILM COATED ORAL at 05:01

## 2025-01-04 RX ADMIN — HYDROXYZINE HYDROCHLORIDE 50 MG: 50 TABLET, FILM COATED ORAL at 07:01

## 2025-01-04 NOTE — PLAN OF CARE
Problem: Violence Risk or Actual  Goal: Anger and Impulse Control  Outcome: Progressing  Intervention: Minimize Safety Risk  Flowsheets (Taken 1/4/2025 0937)  Behavior Management: behavioral plan reviewed  Sensory Stimulation Regulation: quiet environment promoted  De-Escalation Techniques: quiet time facilitated  Intervention: Promote Self-Control  Flowsheets (Taken 1/4/2025 0937)  Supportive Measures: self-care encouraged  Environmental Support: calm environment promoted     Problem: Excessive Substance Use  Goal: Optimized Energy Level (Excessive Substance Use)  Outcome: Progressing  Flowsheets (Taken 1/4/2025 0937)  Mutually Determined Action Steps (Optimized Energy Level): grooms self without prompting  Intervention: Optimize Energy Level  Flowsheets (Taken 1/4/2025 0937)  Activity (Behavioral Health): up ad luis carlos  Patient Performed Hygiene: teeth brushed  Diversional Activity: television  Goal: Improved Behavioral Control (Excessive Substance Use)  Outcome: Progressing  Flowsheets (Taken 1/4/2025 0937)  Mutually Determined Action Steps (Improved Behavioral Control): identifies major stressors  Intervention: Promote Behavior and Impulse Control  Flowsheets (Taken 1/4/2025 0937)  Behavior Management: behavioral plan reviewed  Goal: Increased Participation and Engagement (Excessive Substance Use)  Outcome: Progressing  Flowsheets (Taken 1/4/2025 0937)  Mutually Determined Action Steps (Increased Participation and Engagement): discusses ongoing recovery plan  Intervention: Facilitate Participation and Engagement  Flowsheets (Taken 1/4/2025 0937)  Supportive Measures: self-care encouraged  Diversional Activity: television  Goal: Improved Physiologic Symptoms (Excessive Substance Use)  Outcome: Progressing  Flowsheets (Taken 1/4/2025 0937)  Mutually Determined Action Steps (Improved Physiologic Symptoms): discusses use pattern  Intervention: Optimize Physiologic Function  Flowsheets (Taken 1/4/2025 0937)  Oral  Nutrition Promotion: social interaction promoted  Nutrition Interventions: food preferences provided  Goal: Enhanced Social, Occupational or Functional Skills (Excessive Substance Use)  Outcome: Progressing  Flowsheets (Taken 1/4/2025 0937)  Mutually Determined Action Steps (Enhanced Social, Occupational or Functional Skills): identifies support resources  Intervention: Promote Social, Occupational and Functional Ability  Flowsheets (Taken 1/4/2025 0937)  Trust Relationship/Rapport: care explained  Social Functional Ability Promotion: autonomy promoted     Problem: Psychotic Signs/Symptoms  Goal: Improved Behavioral Control (Psychotic Signs/Symptoms)  Outcome: Progressing  Flowsheets (Taken 1/4/2025 0937)  Mutually Determined Action Steps (Improved Behavioral Control): identifies symptoms triggers  Intervention: Manage Behavior  Flowsheets (Taken 1/4/2025 0937)  De-Escalation Techniques: quiet time facilitated  Goal: Optimal Cognitive Function (Psychotic Signs/Symptoms)  Outcome: Progressing  Flowsheets (Taken 1/4/2025 0937)  Mutually Determined Action Steps (Optimal Cognitive Function): participates in attention training  Intervention: Support and Promote Cognitive Ability  Flowsheets (Taken 1/4/2025 0937)  Trust Relationship/Rapport: care explained  Goal: Increased Participation and Engagement (Psychotic Signs/Symptoms)  Outcome: Progressing  Flowsheets (Taken 1/4/2025 0937)  Mutually Determined Action Steps (Increased Participation and Engagement): identifies symptoms triggers  Intervention: Facilitate Participation and Engagement  Flowsheets (Taken 1/4/2025 0937)  Supportive Measures: self-care encouraged  Diversional Activity: television  Goal: Improved Mood Symptoms (Psychotic Signs/Symptoms)  Outcome: Progressing  Flowsheets (Taken 1/4/2025 0937)  Mutually Determined Action Steps (Improved Mood Symptoms): acknowledges progress  Intervention: Optimize Emotion and Mood  Flowsheets (Taken 1/4/2025  0937)  Supportive Measures: self-care encouraged  Diversional Activity: television  Goal: Improved Psychomotor Symptoms (Psychotic Signs/Symptoms)  Outcome: Progressing  Flowsheets (Taken 1/4/2025 0937)  Mutually Determined Action Steps (Improved Psychomotor Symptoms): exhibits decrease in agitation  Intervention: Manage Psychomotor Movement  Flowsheets (Taken 1/4/2025 0937)  Activity (Behavioral Health): up ad luis carlos  Patient Performed Hygiene: teeth brushed  Diversional Activity: television  Goal: Decreased Sensory Symptoms (Psychotic Signs/Symptoms)  Outcome: Progressing  Flowsheets (Taken 1/4/2025 0937)  Mutually Determined Action Steps (Decreased Sensory Symptoms): adheres to medication regimen  Intervention: Minimize and Manage Sensory Impairment  Flowsheets (Taken 1/4/2025 0937)  Sensory Stimulation Regulation: quiet environment promoted  Goal: Improved Sleep (Psychotic Signs/Symptoms)  Outcome: Progressing  Flowsheets (Taken 1/4/2025 0937)  Mutually Determined Action Steps (Improved Sleep): sleeps 4-6 hours at night  Intervention: Promote Healthy Sleep Hygiene  Flowsheets (Taken 1/4/2025 0937)  Sleep Hygiene Promotion: awakenings minimized  Goal: Enhanced Social, Occupational or Functional Skills (Psychotic Signs/Symptoms)  Outcome: Progressing  Flowsheets (Taken 1/4/2025 0937)  Mutually Determined Action Steps (Enhanced Social, Occupational or Functional Skills): identifies support resources  Intervention: Promote Social, Occupational and Functional Ability  Flowsheets (Taken 1/4/2025 0937)  Trust Relationship/Rapport: care explained  Social Functional Ability Promotion: autonomy promoted    He is AAO X 4. Denies SI, HI, hallucinations, and delusions. Good eye contact noted. Speech normal tone and speed. Interacts with selected patients and staff. Medication compliant this AM. Continue plan of care and provide a safe and therapeutic environment. Continue to monitor every fifteen minutes for safety.

## 2025-01-04 NOTE — NURSING
Patient c/o anxiety. PRN hydroxyzine given at 7854.    1305 - Patient verbalizes decreased anxiety

## 2025-01-04 NOTE — NURSING
Patient c/o anxiety. PRN hydroxyzine given at 1327.    1430 - patient verbalizes decreased anxiety

## 2025-01-04 NOTE — NURSING
Patient requesting vistaril d/t anxiety. Vistaril given at 5001. 7830 - patient verbalizes decreased anxiety

## 2025-01-04 NOTE — PLAN OF CARE
Problem: Adult Behavioral Health Plan of Care  Goal: Plan of Care Review  Outcome: Progressing  Flowsheets (Taken 1/3/2025 1952)  Patient Agreement with Plan of Care: agrees  Plan of Care Reviewed With: patient  Goal: Patient-Specific Goal (Individualization)  Outcome: Progressing  Flowsheets (Taken 1/3/2025 1952)  Patient Personal Strengths: independent living skills  Patient Vulnerabilities: substance abuse/addiction  Goal: Adheres to Safety Considerations for Self and Others  Outcome: Progressing  Flowsheets (Taken 1/3/2025 1952)  Adheres to Safety Considerations for Self and Others: making progress toward outcome  Intervention: Develop and Maintain Individualized Safety Plan  Flowsheets (Taken 1/3/2025 1952)  Safety Measures:   safety plan reviewed   safety rounds completed  Goal: Optimized Coping Skills in Response to Life Stressors  Outcome: Progressing  Flowsheets (Taken 1/3/2025 1952)  Optimized Coping Skills in Response to Life Stressors: making progress toward outcome  Intervention: Promote Effective Coping Strategies  Flowsheets (Taken 1/3/2025 1952)  Supportive Measures:   active listening utilized   verbalization of feelings encouraged   self-care encouraged  Goal: Develops/Participates in Therapeutic [place] to Support Successful Transition  Outcome: Progressing  Flowsheets (Taken 1/3/2025 1952)  Develops/Participates in Therapeutic [place] to Support Successful Transition: making progress toward outcome  Intervention: Foster Therapeutic [place]  Flowsheets (Taken 1/3/2025 1952)  Trust Relationship/Rapport:   care explained   questions encouraged   reassurance provided  Goal: Rounds/Family Conference  Outcome: Progressing  Flowsheets (Taken 1/3/2025 1952)  Participants:   milieu/psych techs   nursing     Problem: Violence Risk or Actual  Goal: Anger and Impulse Control  Outcome: Progressing  Intervention: Minimize Safety Risk  Flowsheets (Taken 1/3/2025 1952)  Behavior Management: behavioral plan  reviewed  Sensory Stimulation Regulation: quiet environment promoted  De-Escalation Techniques: quiet time facilitated  Enhanced Safety Measures: monitored by video  Intervention: Promote Self-Control  Flowsheets (Taken 1/3/2025 1952)  Supportive Measures:   active listening utilized   verbalization of feelings encouraged   self-care encouraged  Environmental Support: calm environment promoted     Problem: Excessive Substance Use  Goal: Optimized Energy Level (Excessive Substance Use)  Outcome: Progressing  Flowsheets (Taken 1/3/2025 1952)  Mutually Determined Action Steps (Optimized Energy Level): grooms self without prompting  Intervention: Optimize Energy Level  Flowsheets (Taken 1/3/2025 1952)  Activity (Behavioral Health): up ad luis carlos  Diversional Activity: television  Goal: Improved Behavioral Control (Excessive Substance Use)  Outcome: Progressing  Flowsheets (Taken 1/3/2025 1952)  Mutually Determined Action Steps (Improved Behavioral Control): identifies major stressors  Intervention: Promote Behavior and Impulse Control  Flowsheets (Taken 1/3/2025 1952)  Behavior Management: behavioral plan reviewed  Goal: Increased Participation and Engagement (Excessive Substance Use)  Outcome: Progressing  Flowsheets (Taken 1/3/2025 1952)  Mutually Determined Action Steps (Increased Participation and Engagement): discusses ongoing recovery plan  Intervention: Facilitate Participation and Engagement  Flowsheets (Taken 1/3/2025 1952)  Supportive Measures:   active listening utilized   verbalization of feelings encouraged   self-care encouraged  Diversional Activity: television  Goal: Improved Physiologic Symptoms (Excessive Substance Use)  Outcome: Progressing  Flowsheets (Taken 1/3/2025 1952)  Mutually Determined Action Steps (Improved Physiologic Symptoms): discusses use pattern  Intervention: Optimize Physiologic Function  Flowsheets (Taken 1/3/2025 1952)  Oral Nutrition Promotion:   social interaction promoted   physical  activity promoted  Goal: Enhanced Social, Occupational or Functional Skills (Excessive Substance Use)  Outcome: Progressing  Flowsheets (Taken 1/3/2025 1952)  Mutually Determined Action Steps (Enhanced Social, Occupational or Functional Skills): identifies support resources  Intervention: Promote Social, Occupational and Functional Ability  Flowsheets (Taken 1/3/2025 1952)  Trust Relationship/Rapport:   care explained   questions encouraged   reassurance provided  Social Functional Ability Promotion:   autonomy promoted   self-expression encouraged   social interaction promoted     AAOx4 Pt denies SI/HI/AVH. Pt denies having anxiety and depression, reports good sleep and good appetite. Pt has poor eye contact and affect is flat, but improved. Pt is withdrawn and isolative to room. Q15 min safety checks continued. Pt hyper-somnolent.

## 2025-01-05 PROCEDURE — 25000003 PHARM REV CODE 250

## 2025-01-05 PROCEDURE — 25000003 PHARM REV CODE 250: Performed by: PSYCHIATRY & NEUROLOGY

## 2025-01-05 PROCEDURE — S4991 NICOTINE PATCH NONLEGEND: HCPCS | Performed by: PSYCHIATRY & NEUROLOGY

## 2025-01-05 PROCEDURE — 11400000 HC PSYCH PRIVATE ROOM

## 2025-01-05 RX ADMIN — HYDROXYZINE HYDROCHLORIDE 50 MG: 50 TABLET, FILM COATED ORAL at 04:01

## 2025-01-05 RX ADMIN — HYDROXYZINE HYDROCHLORIDE 50 MG: 50 TABLET, FILM COATED ORAL at 07:01

## 2025-01-05 RX ADMIN — TRAZODONE HYDROCHLORIDE 100 MG: 100 TABLET ORAL at 08:01

## 2025-01-05 RX ADMIN — OLANZAPINE 10 MG: 5 TABLET, FILM COATED ORAL at 08:01

## 2025-01-05 RX ADMIN — HYDROXYZINE HYDROCHLORIDE 50 MG: 50 TABLET, FILM COATED ORAL at 12:01

## 2025-01-05 RX ADMIN — NICOTINE 1 PATCH: 21 PATCH, EXTENDED RELEASE TRANSDERMAL at 07:01

## 2025-01-05 NOTE — NURSING
Patient requested vistaril for anxiety, PRN vistaril given at 1235.      1340 - patient states decreased anxiety.

## 2025-01-05 NOTE — PLAN OF CARE
Problem: Violence Risk or Actual  Goal: Anger and Impulse Control  Outcome: Progressing  Intervention: Minimize Safety Risk  Flowsheets (Taken 1/4/2025 2010)  Behavior Management: behavioral plan reviewed  Sensory Stimulation Regulation: quiet environment promoted  De-Escalation Techniques: quiet time facilitated  Enhanced Safety Measures: monitored by video  Intervention: Promote Self-Control  Flowsheets (Taken 1/4/2025 2010)  Supportive Measures:   active listening utilized   verbalization of feelings encouraged   self-care encouraged  Environmental Support: calm environment promoted     Problem: Excessive Substance Use  Goal: Optimized Energy Level (Excessive Substance Use)  Outcome: Progressing  Flowsheets (Taken 1/4/2025 2010)  Mutually Determined Action Steps (Optimized Energy Level): grooms self without prompting  Intervention: Optimize Energy Level  Flowsheets (Taken 1/4/2025 2010)  Activity (Behavioral Health):   activity encouraged   up ad luis carlos  Diversional Activity: television  Goal: Improved Behavioral Control (Excessive Substance Use)  Outcome: Progressing  Flowsheets (Taken 1/4/2025 2010)  Mutually Determined Action Steps (Improved Behavioral Control): identifies major stressors  Intervention: Promote Behavior and Impulse Control  Flowsheets (Taken 1/4/2025 2010)  Behavior Management: behavioral plan reviewed  Goal: Increased Participation and Engagement (Excessive Substance Use)  Outcome: Progressing  Flowsheets (Taken 1/4/2025 2010)  Mutually Determined Action Steps (Increased Participation and Engagement): discusses ongoing recovery plan  Intervention: Facilitate Participation and Engagement  Flowsheets (Taken 1/4/2025 2010)  Supportive Measures:   active listening utilized   verbalization of feelings encouraged   self-care encouraged  Diversional Activity: television  Goal: Improved Physiologic Symptoms (Excessive Substance Use)  Outcome: Progressing  Flowsheets (Taken 1/4/2025 2010)  Mutually  Determined Action Steps (Improved Physiologic Symptoms): discusses use pattern  Intervention: Optimize Physiologic Function  Flowsheets (Taken 1/4/2025 2010)  Oral Nutrition Promotion:   social interaction promoted   physical activity promoted  Goal: Enhanced Social, Occupational or Functional Skills (Excessive Substance Use)  Outcome: Progressing  Flowsheets (Taken 1/4/2025 2010)  Mutually Determined Action Steps (Enhanced Social, Occupational or Functional Skills): identifies support resources  Intervention: Promote Social, Occupational and Functional Ability  Flowsheets (Taken 1/4/2025 2010)  Trust Relationship/Rapport:   care explained   questions encouraged   reassurance provided  Social Functional Ability Promotion:   autonomy promoted   self-expression encouraged   social interaction promoted     Problem: Psychotic Signs/Symptoms  Goal: Improved Behavioral Control (Psychotic Signs/Symptoms)  Outcome: Progressing  Flowsheets (Taken 1/4/2025 2010)  Mutually Determined Action Steps (Improved Behavioral Control): verbalizes gratifying activity  Intervention: Manage Behavior  Flowsheets (Taken 1/4/2025 2010)  De-Escalation Techniques: quiet time facilitated  Goal: Optimal Cognitive Function (Psychotic Signs/Symptoms)  Outcome: Progressing  Flowsheets (Taken 1/4/2025 2010)  Mutually Determined Action Steps (Optimal Cognitive Function): remains focused during activity  Intervention: Support and Promote Cognitive Ability  Flowsheets (Taken 1/4/2025 2010)  Trust Relationship/Rapport:   care explained   questions encouraged   reassurance provided  Goal: Increased Participation and Engagement (Psychotic Signs/Symptoms)  Outcome: Progressing  Flowsheets (Taken 1/4/2025 2010)  Mutually Determined Action Steps (Increased Participation and Engagement): verbalizes gratifying activity  Intervention: Facilitate Participation and Engagement  Flowsheets (Taken 1/4/2025 2010)  Supportive Measures:   active listening utilized    verbalization of feelings encouraged   self-care encouraged  Diversional Activity: television  Goal: Improved Mood Symptoms (Psychotic Signs/Symptoms)  Outcome: Progressing  Flowsheets (Taken 1/4/2025 2010)  Mutually Determined Action Steps (Improved Mood Symptoms): acknowledges progress  Intervention: Optimize Emotion and Mood  Flowsheets (Taken 1/4/2025 2010)  Supportive Measures:   active listening utilized   verbalization of feelings encouraged   self-care encouraged  Diversional Activity: television  Goal: Improved Psychomotor Symptoms (Psychotic Signs/Symptoms)  Outcome: Progressing  Flowsheets (Taken 1/4/2025 2010)  Mutually Determined Action Steps (Improved Psychomotor Symptoms): adheres to medication regimen  Intervention: Manage Psychomotor Movement  Flowsheets (Taken 1/4/2025 2010)  Activity (Behavioral Health):   activity encouraged   up ad luis carlos  Diversional Activity: television  Goal: Decreased Sensory Symptoms (Psychotic Signs/Symptoms)  Outcome: Progressing  Flowsheets (Taken 1/4/2025 2010)  Mutually Determined Action Steps (Decreased Sensory Symptoms): adheres to medication regimen  Intervention: Minimize and Manage Sensory Impairment  Flowsheets (Taken 1/4/2025 2010)  Sensory Stimulation Regulation: quiet environment promoted  Goal: Improved Sleep (Psychotic Signs/Symptoms)  Outcome: Progressing  Flowsheets (Taken 1/4/2025 2010)  Mutually Determined Action Steps (Improved Sleep): sleeps 4-6 hours at night  Intervention: Promote Healthy Sleep Hygiene  Flowsheets (Taken 1/4/2025 2010)  Sleep Hygiene Promotion:   awakenings minimized   regular sleep pattern promoted  Goal: Enhanced Social, Occupational or Functional Skills (Psychotic Signs/Symptoms)  Outcome: Progressing  Flowsheets (Taken 1/4/2025 2010)  Mutually Determined Action Steps (Enhanced Social, Occupational or Functional Skills): identifies support resources  Intervention: Promote Social, Occupational and Functional Ability  Flowsheets (Taken  1/4/2025 2010)  Trust Relationship/Rapport:   care explained   questions encouraged   reassurance provided  Social Functional Ability Promotion:   autonomy promoted   self-expression encouraged   social interaction promoted     AAOx4 Pt denies SI/HI/AVH. Pt endorses having anxiety (taking PRN medication often) and depression, reports good sleep and good appetite. Pt has poor eye contact and affect is flat, but improved. Pt is withdrawn and isolative to room. Q15 min safety checks continued.

## 2025-01-05 NOTE — NURSING
Patient requested vistaril for anxiety, PRN vistaril given at 1636.    1741 - patient verbalized decreased anxiety

## 2025-01-05 NOTE — NURSING
Patient requested vistaril for anxiety. PRN vistaril given at 0738    0840 - Patient verbalized decreased anxiety.

## 2025-01-05 NOTE — PLAN OF CARE
Problem: Violence Risk or Actual  Goal: Anger and Impulse Control  Outcome: Progressing  Intervention: Minimize Safety Risk  Flowsheets (Taken 1/5/2025 0813)  Behavior Management: behavioral plan reviewed  Sensory Stimulation Regulation: quiet environment promoted  De-Escalation Techniques: quiet time facilitated  Intervention: Promote Self-Control  Flowsheets (Taken 1/5/2025 0813)  Supportive Measures: self-care encouraged  Environmental Support: calm environment promoted     Problem: Excessive Substance Use  Goal: Optimized Energy Level (Excessive Substance Use)  Outcome: Progressing  Flowsheets (Taken 1/5/2025 0813)  Mutually Determined Action Steps (Optimized Energy Level): grooms self without prompting  Intervention: Optimize Energy Level  Flowsheets (Taken 1/5/2025 0813)  Activity (Behavioral Health): up ad luis carlos  Patient Performed Hygiene: teeth brushed  Diversional Activity: television  Goal: Improved Behavioral Control (Excessive Substance Use)  Outcome: Progressing  Flowsheets (Taken 1/5/2025 0813)  Mutually Determined Action Steps (Improved Behavioral Control): identifies major stressors  Intervention: Promote Behavior and Impulse Control  Flowsheets (Taken 1/5/2025 0813)  Behavior Management: behavioral plan reviewed  Goal: Increased Participation and Engagement (Excessive Substance Use)  Outcome: Progressing  Flowsheets (Taken 1/5/2025 0813)  Mutually Determined Action Steps (Increased Participation and Engagement): discusses ongoing recovery plan  Intervention: Facilitate Participation and Engagement  Flowsheets (Taken 1/5/2025 0813)  Supportive Measures: self-care encouraged  Diversional Activity: television  Goal: Improved Physiologic Symptoms (Excessive Substance Use)  Outcome: Progressing  Flowsheets (Taken 1/5/2025 0813)  Mutually Determined Action Steps (Improved Physiologic Symptoms): discusses use pattern  Intervention: Optimize Physiologic Function  Flowsheets (Taken 1/5/2025 0813)  Oral  Nutrition Promotion: social interaction promoted  Nutrition Interventions: food preferences provided  Goal: Enhanced Social, Occupational or Functional Skills (Excessive Substance Use)  Outcome: Progressing  Flowsheets (Taken 1/5/2025 0813)  Mutually Determined Action Steps (Enhanced Social, Occupational or Functional Skills): identifies personal strengths  Intervention: Promote Social, Occupational and Functional Ability  Flowsheets (Taken 1/5/2025 0813)  Trust Relationship/Rapport: care explained  Social Functional Ability Promotion: self-expression encouraged     Problem: Psychotic Signs/Symptoms  Goal: Improved Behavioral Control (Psychotic Signs/Symptoms)  Outcome: Progressing  Flowsheets (Taken 1/5/2025 0813)  Mutually Determined Action Steps (Improved Behavioral Control): verbalizes gratifying activity  Intervention: Manage Behavior  Flowsheets (Taken 1/5/2025 0813)  De-Escalation Techniques: quiet time facilitated  Goal: Optimal Cognitive Function (Psychotic Signs/Symptoms)  Outcome: Progressing  Flowsheets (Taken 1/5/2025 0813)  Mutually Determined Action Steps (Optimal Cognitive Function): remains focused during activity  Intervention: Support and Promote Cognitive Ability  Flowsheets (Taken 1/5/2025 0813)  Trust Relationship/Rapport: care explained  Goal: Increased Participation and Engagement (Psychotic Signs/Symptoms)  Outcome: Progressing  Flowsheets (Taken 1/5/2025 0813)  Mutually Determined Action Steps (Increased Participation and Engagement): verbalizes gratifying activity  Intervention: Facilitate Participation and Engagement  Flowsheets (Taken 1/5/2025 0813)  Supportive Measures: self-care encouraged  Diversional Activity: television  Goal: Improved Mood Symptoms (Psychotic Signs/Symptoms)  Outcome: Progressing  Flowsheets (Taken 1/5/2025 0813)  Mutually Determined Action Steps (Improved Mood Symptoms): acknowledges progress  Intervention: Optimize Emotion and Mood  Flowsheets (Taken 1/5/2025  0813)  Supportive Measures: self-care encouraged  Diversional Activity: television  Goal: Improved Psychomotor Symptoms (Psychotic Signs/Symptoms)  Outcome: Progressing  Flowsheets (Taken 1/5/2025 0813)  Mutually Determined Action Steps (Improved Psychomotor Symptoms): adheres to medication regimen  Intervention: Manage Psychomotor Movement  Flowsheets (Taken 1/5/2025 0813)  Activity (Behavioral Health): up ad luis carlos  Patient Performed Hygiene: teeth brushed  Diversional Activity: television  Goal: Decreased Sensory Symptoms (Psychotic Signs/Symptoms)  Outcome: Progressing  Flowsheets (Taken 1/5/2025 0813)  Mutually Determined Action Steps (Decreased Sensory Symptoms): adheres to medication regimen  Intervention: Minimize and Manage Sensory Impairment  Flowsheets (Taken 1/5/2025 0813)  Sensory Stimulation Regulation: quiet environment promoted  Goal: Improved Sleep (Psychotic Signs/Symptoms)  Outcome: Progressing  Flowsheets (Taken 1/5/2025 0813)  Mutually Determined Action Steps (Improved Sleep): sleeps 4-6 hours at night  Intervention: Promote Healthy Sleep Hygiene  Flowsheets (Taken 1/5/2025 0813)  Sleep Hygiene Promotion: awakenings minimized  Goal: Enhanced Social, Occupational or Functional Skills (Psychotic Signs/Symptoms)  Outcome: Progressing  Flowsheets (Taken 1/5/2025 0813)  Mutually Determined Action Steps (Enhanced Social, Occupational or Functional Skills): identifies personal strengths  Intervention: Promote Social, Occupational and Functional Ability  Flowsheets (Taken 1/5/2025 0813)  Trust Relationship/Rapport: care explained  Social Functional Ability Promotion: self-expression encouraged    He is AAO X 4. Denies SI, HI, hallucinations, and delusions. Endorses anxiety this AM. Good eye contact noted. Speech normal tone and speed. Interacts with selected patients and staff. Continue plan of care and provide a safe and therapeutic environment. Continue to monitor every fifteen minutes for safety.

## 2025-01-05 NOTE — NURSING
2006 Pt requested PRN sleep medication. Administered trazodone 100 mg po.    2200 Pt in bed, eyes closed, resting quietly.

## 2025-01-06 VITALS
HEIGHT: 72 IN | DIASTOLIC BLOOD PRESSURE: 90 MMHG | SYSTOLIC BLOOD PRESSURE: 130 MMHG | HEART RATE: 83 BPM | TEMPERATURE: 98 F | BODY MASS INDEX: 21.4 KG/M2 | WEIGHT: 158 LBS | OXYGEN SATURATION: 99 % | RESPIRATION RATE: 18 BRPM

## 2025-01-06 PROBLEM — F11.10 OPIOID ABUSE, CONTINUOUS: Status: ACTIVE | Noted: 2025-01-06

## 2025-01-06 PROBLEM — F15.20 METHAMPHETAMINE ADDICTION: Status: ACTIVE | Noted: 2025-01-06

## 2025-01-06 PROBLEM — F17.210 NICOTINE DEPENDENCE, CIGARETTES, UNCOMPLICATED: Status: ACTIVE | Noted: 2025-01-06

## 2025-01-06 PROBLEM — F29 PSYCHOTIC DISORDER: Status: ACTIVE | Noted: 2025-01-06

## 2025-01-06 PROCEDURE — 25000003 PHARM REV CODE 250: Performed by: PSYCHIATRY & NEUROLOGY

## 2025-01-06 RX ORDER — IBUPROFEN 200 MG
1 TABLET ORAL DAILY
Qty: 28 PATCH | Refills: 0 | Status: SHIPPED | OUTPATIENT
Start: 2025-01-07

## 2025-01-06 RX ORDER — OLANZAPINE 10 MG/1
10 TABLET ORAL NIGHTLY
Qty: 30 TABLET | Refills: 0 | Status: SHIPPED | OUTPATIENT
Start: 2025-01-06 | End: 2026-01-06

## 2025-01-06 RX ADMIN — HYDROXYZINE HYDROCHLORIDE 50 MG: 50 TABLET, FILM COATED ORAL at 07:01

## 2025-01-06 NOTE — GROUP NOTE
Group Psychotherapy       Group Focus: Anger Management   Group topic: Anger Management. Therapist explored patients need for increase in effective communication skills, increase in awareness of triggers and understanding anger.??    Number of patients in attendance: 10    Group Start Time: 1045  Group End Time:  1130  Groups Date: 1/6/2025  Group Topic:  Behavioral Health  Group Department: [facility]  Group Facilitators:  Tavia Kiser  _____________________________________________________________________    Patient Name: Raymond Quinn  MRN: [id number]  Patient Class: IP- Psych   Admission Date\Time: 1/1/2025  2:02 PM  Hospital Length of Stay: 5  Primary Care Provider: No primary care provider on file.     Referred by: Acute Psychiatry Unit Treatment Team     Target symptoms: Substance Abuse and Depression     Patient's response to treatment: pt discharged     Progress toward goals: Progressing well     Interval History:      Diagnosis:      Plan: Pt has discharged

## 2025-01-06 NOTE — PLAN OF CARE
Problem: Violence Risk or Actual  Goal: Anger and Impulse Control  Outcome: Progressing  Intervention: Minimize Safety Risk  Flowsheets (Taken 1/5/2025 2005)  Behavior Management: behavioral plan reviewed  Sensory Stimulation Regulation: quiet environment promoted  De-Escalation Techniques: quiet time facilitated  Enhanced Safety Measures: monitored by video  Intervention: Promote Self-Control  Flowsheets (Taken 1/5/2025 2005)  Supportive Measures:   active listening utilized   verbalization of feelings encouraged   self-care encouraged  Environmental Support: calm environment promoted     Problem: Excessive Substance Use  Goal: Optimized Energy Level (Excessive Substance Use)  Outcome: Progressing  Flowsheets (Taken 1/5/2025 2005)  Mutually Determined Action Steps (Optimized Energy Level): grooms self without prompting  Intervention: Optimize Energy Level  Flowsheets (Taken 1/5/2025 2005)  Activity (Behavioral Health): up ad luis carlos  Diversional Activity: television  Goal: Improved Behavioral Control (Excessive Substance Use)  Outcome: Progressing  Flowsheets (Taken 1/5/2025 2005)  Mutually Determined Action Steps (Improved Behavioral Control): identifies major stressors  Intervention: Promote Behavior and Impulse Control  Flowsheets (Taken 1/5/2025 2005)  Behavior Management: behavioral plan reviewed  Goal: Increased Participation and Engagement (Excessive Substance Use)  Outcome: Progressing  Flowsheets (Taken 1/5/2025 2005)  Mutually Determined Action Steps (Increased Participation and Engagement): discusses ongoing recovery plan  Intervention: Facilitate Participation and Engagement  Flowsheets (Taken 1/5/2025 2005)  Supportive Measures:   active listening utilized   verbalization of feelings encouraged   self-care encouraged  Diversional Activity: television  Goal: Improved Physiologic Symptoms (Excessive Substance Use)  Outcome: Progressing  Flowsheets (Taken 1/5/2025 2005)  Mutually Determined Action Steps  (Improved Physiologic Symptoms): discusses use pattern  Intervention: Optimize Physiologic Function  Flowsheets (Taken 1/5/2025 2005)  Oral Nutrition Promotion:   social interaction promoted   physical activity promoted  Goal: Enhanced Social, Occupational or Functional Skills (Excessive Substance Use)  Outcome: Progressing  Flowsheets (Taken 1/5/2025 2005)  Mutually Determined Action Steps (Enhanced Social, Occupational or Functional Skills): participates in social skills training  Intervention: Promote Social, Occupational and Functional Ability  Flowsheets (Taken 1/5/2025 2005)  Trust Relationship/Rapport:   care explained   questions encouraged   reassurance provided  Social Functional Ability Promotion:   autonomy promoted   self-expression encouraged   social interaction promoted     Problem: Psychotic Signs/Symptoms  Goal: Improved Behavioral Control (Psychotic Signs/Symptoms)  Outcome: Progressing  Flowsheets (Taken 1/5/2025 2005)  Mutually Determined Action Steps (Improved Behavioral Control): verbalizes gratifying activity  Intervention: Manage Behavior  Flowsheets (Taken 1/5/2025 2005)  De-Escalation Techniques: quiet time facilitated  Goal: Optimal Cognitive Function (Psychotic Signs/Symptoms)  Outcome: Progressing  Flowsheets (Taken 1/5/2025 2005)  Mutually Determined Action Steps (Optimal Cognitive Function): participates in attention training  Intervention: Support and Promote Cognitive Ability  Flowsheets (Taken 1/5/2025 2005)  Trust Relationship/Rapport:   care explained   questions encouraged   reassurance provided  Goal: Increased Participation and Engagement (Psychotic Signs/Symptoms)  Outcome: Progressing  Flowsheets (Taken 1/5/2025 2005)  Mutually Determined Action Steps (Increased Participation and Engagement): verbalizes gratifying activity  Intervention: Facilitate Participation and Engagement  Flowsheets (Taken 1/5/2025 2005)  Supportive Measures:   active listening utilized   verbalization  of feelings encouraged   self-care encouraged  Diversional Activity: television  Goal: Improved Mood Symptoms (Psychotic Signs/Symptoms)  Outcome: Progressing  Flowsheets (Taken 1/5/2025 2005)  Mutually Determined Action Steps (Improved Mood Symptoms): acknowledges progress  Intervention: Optimize Emotion and Mood  Flowsheets (Taken 1/5/2025 2005)  Supportive Measures:   active listening utilized   verbalization of feelings encouraged   self-care encouraged  Diversional Activity: television  Goal: Improved Psychomotor Symptoms (Psychotic Signs/Symptoms)  Outcome: Progressing  Flowsheets (Taken 1/5/2025 2005)  Mutually Determined Action Steps (Improved Psychomotor Symptoms): adheres to medication regimen  Intervention: Manage Psychomotor Movement  Flowsheets (Taken 1/5/2025 2005)  Activity (Behavioral Health): up ad luis carlos  Diversional Activity: television  Goal: Decreased Sensory Symptoms (Psychotic Signs/Symptoms)  Outcome: Progressing  Flowsheets (Taken 1/5/2025 2005)  Mutually Determined Action Steps (Decreased Sensory Symptoms): adheres to medication regimen  Intervention: Minimize and Manage Sensory Impairment  Flowsheets (Taken 1/5/2025 2005)  Sensory Stimulation Regulation: quiet environment promoted  Goal: Improved Sleep (Psychotic Signs/Symptoms)  Outcome: Progressing  Flowsheets (Taken 1/5/2025 2005)  Mutually Determined Action Steps (Improved Sleep): sleeps 4-6 hours at night  Intervention: Promote Healthy Sleep Hygiene  Flowsheets (Taken 1/5/2025 2005)  Sleep Hygiene Promotion:   awakenings minimized   regular sleep pattern promoted  Goal: Enhanced Social, Occupational or Functional Skills (Psychotic Signs/Symptoms)  Outcome: Progressing  Flowsheets (Taken 1/5/2025 2005)  Mutually Determined Action Steps (Enhanced Social, Occupational or Functional Skills): participates in social skills training  Intervention: Promote Social, Occupational and Functional Ability  Flowsheets (Taken 1/5/2025 2005)  Trust  Relationship/Rapport:   care explained   questions encouraged   reassurance provided  Social Functional Ability Promotion:   autonomy promoted   self-expression encouraged   social interaction promoted   AAOx4 Pt denies SI/HI/AVH. Pt endorses having anxiety and denies depression, reports good sleep with PRN Trazodone and good appetite. Pt has good eye contact and affect is appropriate. Q15 min safety checks continued. [place]

## 2025-01-06 NOTE — NURSING
At 0742, complaints of anxiety.  Anxiety level 1/10.  Atarax 50 mg., administered.  At 0842, verbalizes no anxiety.

## 2025-01-06 NOTE — NURSING
Discharge Note:    Raymond Quinn is a 44 y.o. male, : 1980, MRN: [id number], admitted on 2025 for Narendra Garzon MD with a diagnosis of Psychosis [F29]  Psychosis [F29].    Patient discharged on 2025 per physician orders in stable condition. Patient denied suicidal ideation, homicidal ideation, or hallucinations. Patient was discharged with valuables, personal belongings, prescriptions, discharge instructions, printed Warning Sings of Self-Harm pursuant to Act 737 and, an educational handout explaining the diagnosis and prescribed medications. Patient verbalized understanding of the discharge instructions and importance of follow-up visits. Patient was escorted out of the facility by [facility] and placed into a secure transport vehicle to be transported to group home.     Patient discharged on the following medications:     Medication List        START taking these medications      nicotine 21 mg/24 hr  Commonly known as: NICODERM CQ  Place 1 patch onto the skin once daily.  Start taking on: 2025     OLANZapine 10 MG tablet  Commonly known as: ZyPREXA  Take 1 tablet (10 mg total) by mouth every evening.               Where to Get Your Medications        You can get these medications from any pharmacy    Bring a paper prescription for each of these medications  nicotine 21 mg/24 hr  OLANZapine 10 MG tablet

## 2025-01-06 NOTE — NURSING
2002 Pt requested PRN sleep medication. Administered trazodone 100 mg po.       2200 Pt in bed, eyes closed, resting quietly.   Addended by: Rachel Prabhakar on: 7/11/2022 04:02 PM     Modules accepted: Orders

## 2025-01-06 NOTE — CARE UPDATE
"Medicaid Eligibility Verification SystemPrint   IMPORTANT: DO NOT use the "BACK" browser button - please use the navigation menu.   Note: For Technical Support, Please Contact [phone]   Note: For Eligibility Information Support, Please Contact [phone] or [phone]   Note: The date field formats have changed - enter date in MM/DD/YYYY format   NOTE: CMS REGULATIONS LIMIT PROVIDING RECIPIENT ELIGIBILITY OLDER THAN THE MOST CURRENT 12 MONTHS.  Search Criteria  Search Type Recipient ID and  Recipient ID [id number] Date of Birth 1980 Plan Date 2025   Subscriber Information  Name SANNA SAMAYOA     Subscriber ID [id number]   Date of Birth 1980   Sex Male   Address [address] [place] [id number]    Provider Information  Provider [facility]   NPI [id number]   Submitter ID [id number]      For name or address discrepancies, recipients must call [place] Medicaid-Eligibility Hotline [phone].  For dates of service from 2023 through 10/27/2023, if the Managed Care Coordinator listed for the Plan Coverage is [employer]it Plan, all pharmacy POS transaction should be processed as FFS using the Medicaid Recipient ID or CCN and BIN: [id number], PCN: [id number] and Group: [id number].  For dates of service on/after 2015, if there is no Managed Care Coordinator listed for the Plan Coverage Description (Medical Care or Specialized Behavioral Health Care or Dental Care), claims should be sent to [employer].  Health Benefit Plan Coverage  Benefit Service Type Code Insurance Type Plan Coverage Description   Active Coverage Health Benefit Plan Coverage Medicaid Eligible for Medicaid on Plan Date.   Plan Begin Date 2024      Deductible Health Benefit Plan Coverage Medicaid Health Plan Base Deductible is $0 for In Plan Network and Out of Plan Network.      Deductible Health Benefit Plan Coverage Medicaid Health Plan Remaining Deductible is " $0 for In Plan Network and Out of Plan Network.      Benefit Description Health Benefit Plan Coverage Medicaid    PREFERRED LANGUAGE: ENGLISH      Managed Care Coordinator Medical Care Medicaid [facility] PLAN   PHARMACY PBM IS USSCRIPT   Managed Care Organization [facility]   Telephone [phone]      Managed Care Coordinator Specialized Behavioral Health Care Medicaid [facility]   PHARMACY PBM IS USSCRIPT   Payer [facility]   Telephone [phone]      Managed Care Coordinator Dental Care Medicaid DENTAL BENEFITS    Payer [employer]   Telephone [phone]   URL [email or web address]      Active Coverage  Medicaid Eligible for Medicaid on Plan Date.      Co-Insurance  Medicaid MEDICAID - Benefit Co-Insurance is 0% for In Plan Network and Out of Plan Network      Co-Payment  Medicaid MEDICAID - Benefit Co-Pay is $0 for In Plan Network and Out of Plan Network        Please Note: Individual coverage level applies to all benefits.  Request Reference Number [id number] Response Reference Number [id number]   Transaction run on 01/06/2025 at 10:42:21 CT by [employer] - [place] Medicaid

## 2025-01-06 NOTE — DISCHARGE SUMMARY
"DISCHARGE SUMMARY  PSYCHIATRY      Admit Date: 1/1/2025  2:02 PM    Discharge Date:  1/6/2025    SITE:   [facility] *  [facility]    Discharge Attending Physician: Narendra Garzon M.D.    Chief Complaint:  Hearing voices    History of Present Illness On Admit:   Raymond Quinn is a 44-year-old male placed under a PEC at [facility] due to auditory hallucinations persisting for the past six months. According to ED records, the patient described recent revelations about his girlfriend, stating that she was not who he believed she was and that he had found the actual person he thought he was dating. 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, which he believes is floating in his head.     The patient reports a long history of hearing voices, presenting today as calm, redirectable, but exhibiting bizarre and grandiose thought content. He attributes the alleged brain chip to his sisters brother-in-law, whom he claims "injects pacemakers" and believes he performed this while the patient was at a hotel. The voices reportedly urge him to "turn himself in," but he denies any wrongdoing and engages in arguments with the voices.     The patient states that previous medications have been ineffective, leading to non-adherence post-hospital discharge. However, he voluntarily sought a medication adjustment and, after initial resistance, agreed to trial medication for his symptoms. He denies any current thoughts of self-harm or harm to others.     Zyprexa has been initiated, and the patient will be monitored closely for symptom improvement and medication adherence during hospitalization.      Admit Mental Status Exam:  General Appearance: appears stated age, well developed and nourished, adequately groomed and appropriately dressed, in no acute distress  Arousal: alert  Behavior: appropriate eye-contact, under good behavioral control, redirectable  Movements " "and Motor Activity: no abnormal involuntary movements noted; no tics, no tremors, no akathisia, no dystonia, no evidence of tardive dyskinesia; no psychomotor agitation or retardation  Orientation: intact; oriented fully to person, place, time and situation  Speech: intact; normal rate, rhythm, volume, tone and pitch; conversational, spontaneous, and coherent  Mood: Irritable and Dysphoric  Affect: irritable  Thought Process: intact, linear, goal-directed, organized, logical  Associations: intact, no loosening of associations  Thought Content and Perceptions: no suicidal ideation, no homicidal ideation, + auditory hallucinations, + command hallucinations, + paranoid ideation  Recent and Remote Memory: intact; per interview/observation with patient  Attention and Concentration: intact; per interview/observation with patient  Fund of Knowledge: intact; based on history, vocabulary, fund of knowledge, syntax, grammar, and content  Insight: questionable; based on understanding of severity of illness and HPI  Judgment: questionable; based on patient's behavior and HPI      Diagnoses:  PRINCIPAL PROBLEM:  Psychotic disorder      PROBLEM LIST    Psychotic disorder    Methamphetamine addiction    Opioid abuse, continuous    Nicotine dependence, cigarettes, uncomplicated        Hospital Course:   Patient was admitted to [facility] and started on Zyprexa.    1/3/24  Today patient presents calm and cooperative. Patient states that he is feeling "alright". Patient di not endorse any bizarre behavior during this exam. Staff reported that he was continuing to express thoughts of the chip in his brain last night. Endorses that he is eating and sleeping appropriately. Tolerating medication well without issue. Will conitnue the current regimen and monitor for need to augment.       1/6/24  Due for discharge to [facility] today.  Tolerating current medication regimen without issues.  Denies thoughts of self-harm or harm to " "others.  No overt behavioral issues reported by staff.  Will follow-up with [facility].  Will proceed with discharge.      Current Medications:   Scheduled Meds:    nicotine  1 patch Transdermal Daily    OLANZapine  10 mg Oral QHS          DISCHARGE EXAMINATION    VITALS   Vitals:    01/03/25 1600 01/04/25 0701 01/05/25 0701 01/06/25 0701   BP: 109/77 116/86 101/71 (!) 130/90   Pulse: 93 92 83 83   Resp: 18 18 17 18   Temp: 98.1 °F (36.7 °C) 97.7 °F (36.5 °C) 98.2 °F (36.8 °C) 97.5 °F (36.4 °C)   SpO2: 100% 98% 98% 99%   Weight:       Height:             Discharge Mental Status Exam:  General Appearance: appears stated age, well-developed, well-nourished  Arousal: alert  Behavior: cooperative  Movements and Motor Activity: no abnormal involuntary movements noted  Orientation: oriented to person, place, time, and situation  Speech: normal rate, normal rhythm, normal volume, normal tone  Mood: "Ok"  Affect: constricted  Thought Process: linear  Associations: intact  Thought Content and Perceptions: no suicidal ideation, no homicidal ideation, no auditory hallucinations, no visual hallucinations, no paranoid ideation, no ideas of reference  Recent and Remote Memory: recent memory intact, remote memory intact; per interview/observation with patient  Attention and Concentration: intact, attentive to conversation; per interview/observation with patient  Fund of Knowledge: intact, aware of current events, vocabulary appropriate; based on history, vocabulary, fund of knowledge, syntax, grammar, and content  Insight: questionable; based on understanding of severity of illness and HPI  Judgment: questionable; based on patient's behavior and HPI       Discharge Condition:  Stable    Prognosis:  Fair    Justification for multiple antipsychotics:  N/a    Disposition:  Discharged to [facility]    Follow-up:   Follow-up Information       [facility] Follow up.    Why: Pt will utilize " walk-in services Monday, Tuesday, or Wednesday @8AM-10AM Please bring your id, Medicaid card, and discharge papers with you for your appointment.  Contact information:  [facility]  [place] [address]    [phone]                           Medication Regimen:    Current Facility-Administered Medications:     acetaminophen tablet 650 mg, 650 mg, Oral, Q6H PRN, Narendra Garzon MD    aluminum-magnesium hydroxide-simethicone 200-200-20 mg/5 mL suspension 30 mL, 30 mL, Oral, Q6H PRN, Narendra Garzon MD    diphenhydrAMINE capsule 50 mg, 50 mg, Oral, Q4H PRN, Narendra Garzon MD    diphenhydrAMINE injection 50 mg, 50 mg, Intramuscular, Q4H PRN, Narendra Garzon MD    haloperidol lactate injection 10 mg, 10 mg, Intramuscular, Q4H PRN, Narendra Garzon MD    haloperidoL tablet 10 mg, 10 mg, Oral, Q4H PRN, Narendra Garzon MD    hydrOXYzine tablet 50 mg, 50 mg, Oral, Q4H PRN, Narendra Garzon MD, 50 mg at 01/06/25 0742    LORazepam injection 2 mg, 2 mg, Intramuscular, Q4H PRN, Narendra Garzon MD    LORazepam tablet 2 mg, 2 mg, Oral, Q4H PRN, Narendra Garzon MD    nicotine 21 mg/24 hr 1 patch, 1 patch, Transdermal, Daily, Narendra Garzon MD, 1 patch at 01/05/25 0738    OLANZapine tablet 10 mg, 10 mg, Oral, QHS, Dario Aguilera, PMHNP, 10 mg at 01/05/25 2002    traZODone tablet 100 mg, 100 mg, Oral, Nightly PRN, Narendra Garzon MD, 100 mg at 01/05/25 2002      Patient Instructions:   Continue medication regimen as prescribed.    Disposition plan per  - see  notes for details.    Patient instructed to call 911 or present to emergency department if any of the following complications develop status post discharge: suicidality, homicidality, or grave disability.     Total time spent discharging patient: <30 minutes      Narendra Garzon M.D.

## 2025-01-06 NOTE — PLAN OF CARE
Problem: Violence Risk or Actual  Goal: Anger and Impulse Control  Outcome: Met     Problem: Excessive Substance Use  Goal: Optimized Energy Level (Excessive Substance Use)  Outcome: Met  Goal: Improved Behavioral Control (Excessive Substance Use)  Outcome: Met  Goal: Increased Participation and Engagement (Excessive Substance Use)  Outcome: Met  Goal: Improved Physiologic Symptoms (Excessive Substance Use)  Outcome: Met  Goal: Enhanced Social, Occupational or Functional Skills (Excessive Substance Use)  Outcome: Met     Problem: Psychotic Signs/Symptoms  Goal: Improved Behavioral Control (Psychotic Signs/Symptoms)  Outcome: Met  Goal: Optimal Cognitive Function (Psychotic Signs/Symptoms)  Outcome: Met  Goal: Increased Participation and Engagement (Psychotic Signs/Symptoms)  Outcome: Met  Goal: Improved Mood Symptoms (Psychotic Signs/Symptoms)  Outcome: Met  Goal: Improved Psychomotor Symptoms (Psychotic Signs/Symptoms)  Outcome: Met  Goal: Decreased Sensory Symptoms (Psychotic Signs/Symptoms)  Outcome: Met  Goal: Improved Sleep (Psychotic Signs/Symptoms)  Outcome: Met  Goal: Enhanced Social, Occupational or Functional Skills (Psychotic Signs/Symptoms)  Outcome: Met

## 2025-08-11 ENCOUNTER — HOSPITAL ENCOUNTER (EMERGENCY)
Facility: HOSPITAL | Age: 45
Discharge: PSYCHIATRIC OR SUBSTANCE USE FACILITY | End: 2025-08-12
Attending: FAMILY MEDICINE
Payer: MEDICAID

## 2025-08-11 DIAGNOSIS — F10.929 ALCOHOLIC INTOXICATION WITH COMPLICATION: Primary | ICD-10-CM

## 2025-08-11 DIAGNOSIS — Z00.8 MEDICAL CLEARANCE FOR PSYCHIATRIC ADMISSION: ICD-10-CM

## 2025-08-11 DIAGNOSIS — F10.10 ALCOHOL ABUSE: ICD-10-CM

## 2025-08-11 DIAGNOSIS — F23 ACUTE PSYCHOSIS: ICD-10-CM

## 2025-08-11 LAB
ACCEPTIBLE SP GR UR QL: 1.01 (ref 1–1.03)
ALBUMIN SERPL-MCNC: 4.3 G/DL (ref 3.5–5)
ALBUMIN/GLOB SERPL: 1.3 RATIO (ref 1.1–2)
ALP SERPL-CCNC: 53 UNIT/L (ref 40–150)
ALT SERPL-CCNC: 14 UNIT/L (ref 0–55)
AMPHET UR QL SCN: NEGATIVE
ANION GAP SERPL CALC-SCNC: 12 MEQ/L
APAP SERPL-MCNC: <3 UG/ML (ref 10–30)
AST SERPL-CCNC: 19 UNIT/L (ref 11–45)
BACTERIA #/AREA URNS AUTO: ABNORMAL /HPF
BARBITURATE SCN PRESENT UR: NEGATIVE
BASOPHILS # BLD AUTO: 0.05 X10(3)/MCL
BASOPHILS NFR BLD AUTO: 0.6 %
BENZODIAZ UR QL SCN: NEGATIVE
BILIRUB SERPL-MCNC: 0.2 MG/DL
BILIRUB UR QL STRIP.AUTO: NEGATIVE
BUN SERPL-MCNC: 14.9 MG/DL (ref 8.9–20.6)
CALCIUM SERPL-MCNC: 9.4 MG/DL (ref 8.4–10.2)
CANNABINOIDS UR QL SCN: NEGATIVE
CHLORIDE SERPL-SCNC: 112 MMOL/L (ref 98–107)
CLARITY UR: CLEAR
CO2 SERPL-SCNC: 19 MMOL/L (ref 22–29)
COCAINE UR QL SCN: NEGATIVE
COLOR UR AUTO: COLORLESS
CREAT SERPL-MCNC: 0.85 MG/DL (ref 0.72–1.25)
CREAT/UREA NIT SERPL: 18
EOSINOPHIL # BLD AUTO: 0.12 X10(3)/MCL (ref 0–0.9)
EOSINOPHIL NFR BLD AUTO: 1.4 %
ERYTHROCYTE [DISTWIDTH] IN BLOOD BY AUTOMATED COUNT: 11.9 % (ref 11.5–17)
ETHANOL SERPL-MCNC: 211 MG/DL
FENTANYL UR QL SCN: NEGATIVE
GFR SERPLBLD CREATININE-BSD FMLA CKD-EPI: >60 ML/MIN/1.73/M2
GLOBULIN SER-MCNC: 3.3 GM/DL (ref 2.4–3.5)
GLUCOSE SERPL-MCNC: 97 MG/DL (ref 74–100)
GLUCOSE UR QL STRIP: NORMAL
HCT VFR BLD AUTO: 43.4 % (ref 42–52)
HGB BLD-MCNC: 15.1 G/DL (ref 14–18)
HGB UR QL STRIP: NEGATIVE
HYALINE CASTS #/AREA URNS LPF: ABNORMAL /LPF
IMM GRANULOCYTES # BLD AUTO: 0.01 X10(3)/MCL (ref 0–0.04)
IMM GRANULOCYTES NFR BLD AUTO: 0.1 %
KETONES UR QL STRIP: NEGATIVE
LEUKOCYTE ESTERASE UR QL STRIP: NEGATIVE
LYMPHOCYTES # BLD AUTO: 3.02 X10(3)/MCL (ref 0.6–4.6)
LYMPHOCYTES NFR BLD AUTO: 36.2 %
MCH RBC QN AUTO: 32.4 PG (ref 27–31)
MCHC RBC AUTO-ENTMCNC: 34.8 G/DL (ref 33–36)
MCV RBC AUTO: 93.1 FL (ref 80–94)
MDMA UR QL SCN: NEGATIVE
MONOCYTES # BLD AUTO: 0.5 X10(3)/MCL (ref 0.1–1.3)
MONOCYTES NFR BLD AUTO: 6 %
MUCOUS THREADS URNS QL MICRO: ABNORMAL /LPF
NEUTROPHILS # BLD AUTO: 4.64 X10(3)/MCL (ref 2.1–9.2)
NEUTROPHILS NFR BLD AUTO: 55.7 %
NITRITE UR QL STRIP: NEGATIVE
NRBC BLD AUTO-RTO: 0 %
OPIATES UR QL SCN: NEGATIVE
PCP UR QL: NEGATIVE
PH UR STRIP: 5 [PH]
PH UR: 5 [PH] (ref 3–11)
PLATELET # BLD AUTO: 274 X10(3)/MCL (ref 130–400)
PMV BLD AUTO: 10.5 FL (ref 7.4–10.4)
POTASSIUM SERPL-SCNC: 4.2 MMOL/L (ref 3.5–5.1)
PROT SERPL-MCNC: 7.6 GM/DL (ref 6.4–8.3)
PROT UR QL STRIP: NEGATIVE
RBC # BLD AUTO: 4.66 X10(6)/MCL (ref 4.7–6.1)
RBC #/AREA URNS AUTO: ABNORMAL /HPF
SALICYLATES SERPL-MCNC: <5 MG/DL (ref 15–30)
SODIUM SERPL-SCNC: 143 MMOL/L (ref 136–145)
SP GR UR STRIP.AUTO: 1.01 (ref 1–1.03)
SQUAMOUS #/AREA URNS LPF: ABNORMAL /HPF
UROBILINOGEN UR STRIP-ACNC: NORMAL
WBC # BLD AUTO: 8.34 X10(3)/MCL (ref 4.5–11.5)
WBC #/AREA URNS AUTO: ABNORMAL /HPF

## 2025-08-11 PROCEDURE — 80053 COMPREHEN METABOLIC PANEL: CPT | Performed by: FAMILY MEDICINE

## 2025-08-11 PROCEDURE — 81001 URINALYSIS AUTO W/SCOPE: CPT | Performed by: FAMILY MEDICINE

## 2025-08-11 PROCEDURE — 96372 THER/PROPH/DIAG INJ SC/IM: CPT | Performed by: FAMILY MEDICINE

## 2025-08-11 PROCEDURE — 99285 EMERGENCY DEPT VISIT HI MDM: CPT | Mod: 25

## 2025-08-11 PROCEDURE — 82077 ASSAY SPEC XCP UR&BREATH IA: CPT | Performed by: FAMILY MEDICINE

## 2025-08-11 PROCEDURE — 80179 DRUG ASSAY SALICYLATE: CPT | Performed by: FAMILY MEDICINE

## 2025-08-11 PROCEDURE — 63600175 PHARM REV CODE 636 W HCPCS: Performed by: FAMILY MEDICINE

## 2025-08-11 PROCEDURE — 85025 COMPLETE CBC W/AUTO DIFF WBC: CPT | Performed by: FAMILY MEDICINE

## 2025-08-11 PROCEDURE — 80143 DRUG ASSAY ACETAMINOPHEN: CPT | Performed by: FAMILY MEDICINE

## 2025-08-11 PROCEDURE — 25000003 PHARM REV CODE 250: Performed by: FAMILY MEDICINE

## 2025-08-11 PROCEDURE — 80307 DRUG TEST PRSMV CHEM ANLYZR: CPT | Performed by: FAMILY MEDICINE

## 2025-08-11 RX ORDER — ONDANSETRON 4 MG/1
4 TABLET, ORALLY DISINTEGRATING ORAL
Status: COMPLETED | OUTPATIENT
Start: 2025-08-11 | End: 2025-08-11

## 2025-08-11 RX ORDER — ZIPRASIDONE MESYLATE 20 MG/ML
20 INJECTION, POWDER, LYOPHILIZED, FOR SOLUTION INTRAMUSCULAR
Status: COMPLETED | OUTPATIENT
Start: 2025-08-11 | End: 2025-08-11

## 2025-08-11 RX ADMIN — ZIPRASIDONE MESYLATE 20 MG: 20 INJECTION, POWDER, LYOPHILIZED, FOR SOLUTION INTRAMUSCULAR at 08:08

## 2025-08-11 RX ADMIN — ONDANSETRON 4 MG: 4 TABLET, ORALLY DISINTEGRATING ORAL at 09:08

## 2025-08-12 VITALS
DIASTOLIC BLOOD PRESSURE: 79 MMHG | SYSTOLIC BLOOD PRESSURE: 125 MMHG | HEIGHT: 73 IN | TEMPERATURE: 98 F | WEIGHT: 165 LBS | OXYGEN SATURATION: 100 % | HEART RATE: 82 BPM | BODY MASS INDEX: 21.87 KG/M2 | RESPIRATION RATE: 18 BRPM

## 2025-08-12 LAB — ETHANOL SERPL-MCNC: 124 MG/DL

## 2025-08-12 PROCEDURE — 82077 ASSAY SPEC XCP UR&BREATH IA: CPT | Performed by: FAMILY MEDICINE

## 2025-09-01 ENCOUNTER — HOSPITAL ENCOUNTER (EMERGENCY)
Facility: HOSPITAL | Age: 45
Discharge: ANOTHER HOSPITAL | End: 2025-09-01
Attending: INTERNAL MEDICINE
Payer: MEDICAID

## 2025-09-01 VITALS
SYSTOLIC BLOOD PRESSURE: 121 MMHG | WEIGHT: 165 LBS | DIASTOLIC BLOOD PRESSURE: 84 MMHG | TEMPERATURE: 98 F | OXYGEN SATURATION: 97 % | RESPIRATION RATE: 17 BRPM | BODY MASS INDEX: 22.35 KG/M2 | HEIGHT: 72 IN | HEART RATE: 111 BPM

## 2025-09-01 DIAGNOSIS — F10.21 HISTORY OF ALCOHOLISM: ICD-10-CM

## 2025-09-01 DIAGNOSIS — F22 PARANOID DELUSION: Primary | ICD-10-CM

## 2025-09-01 DIAGNOSIS — Z00.8 MEDICAL CLEARANCE FOR PSYCHIATRIC ADMISSION: ICD-10-CM

## 2025-09-01 LAB
ALBUMIN SERPL-MCNC: 4.5 G/DL (ref 3.5–5)
ALBUMIN/GLOB SERPL: 1.2 RATIO (ref 1.1–2)
ALP SERPL-CCNC: 69 UNIT/L (ref 40–150)
ALT SERPL-CCNC: 31 UNIT/L (ref 0–55)
ANION GAP SERPL CALC-SCNC: 11 MEQ/L
AST SERPL-CCNC: 18 UNIT/L (ref 11–45)
BASOPHILS # BLD AUTO: 0.09 X10(3)/MCL
BASOPHILS NFR BLD AUTO: 0.7 %
BILIRUB SERPL-MCNC: 1.1 MG/DL
BUN SERPL-MCNC: 32.3 MG/DL (ref 8.9–20.6)
CALCIUM SERPL-MCNC: 9.9 MG/DL (ref 8.4–10.2)
CHLORIDE SERPL-SCNC: 107 MMOL/L (ref 98–107)
CO2 SERPL-SCNC: 21 MMOL/L (ref 22–29)
CREAT SERPL-MCNC: 1.37 MG/DL (ref 0.72–1.25)
CREAT/UREA NIT SERPL: 24
EOSINOPHIL # BLD AUTO: 0.05 X10(3)/MCL (ref 0–0.9)
EOSINOPHIL NFR BLD AUTO: 0.4 %
ERYTHROCYTE [DISTWIDTH] IN BLOOD BY AUTOMATED COUNT: 12.1 % (ref 11.5–17)
ETHANOL SERPL-MCNC: <10 MG/DL
GFR SERPLBLD CREATININE-BSD FMLA CKD-EPI: >60 ML/MIN/1.73/M2
GLOBULIN SER-MCNC: 3.7 GM/DL (ref 2.4–3.5)
GLUCOSE SERPL-MCNC: 155 MG/DL (ref 74–100)
HCT VFR BLD AUTO: 43.2 % (ref 42–52)
HGB BLD-MCNC: 14.9 G/DL (ref 14–18)
HOLD SPECIMEN: NORMAL
HOLD SPECIMEN: NORMAL
IMM GRANULOCYTES # BLD AUTO: 0.04 X10(3)/MCL (ref 0–0.04)
IMM GRANULOCYTES NFR BLD AUTO: 0.3 %
LYMPHOCYTES # BLD AUTO: 2.28 X10(3)/MCL (ref 0.6–4.6)
LYMPHOCYTES NFR BLD AUTO: 17.3 %
MCH RBC QN AUTO: 32.1 PG (ref 27–31)
MCHC RBC AUTO-ENTMCNC: 34.5 G/DL (ref 33–36)
MCV RBC AUTO: 93.1 FL (ref 80–94)
MONOCYTES # BLD AUTO: 1.04 X10(3)/MCL (ref 0.1–1.3)
MONOCYTES NFR BLD AUTO: 7.9 %
NEUTROPHILS # BLD AUTO: 9.71 X10(3)/MCL (ref 2.1–9.2)
NEUTROPHILS NFR BLD AUTO: 73.4 %
NRBC BLD AUTO-RTO: 0 %
PLATELET # BLD AUTO: 352 X10(3)/MCL (ref 130–400)
PMV BLD AUTO: 10.8 FL (ref 7.4–10.4)
POTASSIUM SERPL-SCNC: 4 MMOL/L (ref 3.5–5.1)
PROT SERPL-MCNC: 8.2 GM/DL (ref 6.4–8.3)
RBC # BLD AUTO: 4.64 X10(6)/MCL (ref 4.7–6.1)
SODIUM SERPL-SCNC: 139 MMOL/L (ref 136–145)
WBC # BLD AUTO: 13.21 X10(3)/MCL (ref 4.5–11.5)

## 2025-09-01 PROCEDURE — 82077 ASSAY SPEC XCP UR&BREATH IA: CPT | Performed by: INTERNAL MEDICINE

## 2025-09-01 PROCEDURE — 85025 COMPLETE CBC W/AUTO DIFF WBC: CPT | Performed by: INTERNAL MEDICINE

## 2025-09-01 PROCEDURE — 25000003 PHARM REV CODE 250: Performed by: INTERNAL MEDICINE

## 2025-09-01 PROCEDURE — 80053 COMPREHEN METABOLIC PANEL: CPT | Performed by: INTERNAL MEDICINE

## 2025-09-01 PROCEDURE — 99285 EMERGENCY DEPT VISIT HI MDM: CPT | Mod: 25

## 2025-09-01 PROCEDURE — 93005 ELECTROCARDIOGRAM TRACING: CPT

## 2025-09-01 RX ORDER — IBUPROFEN 200 MG
1 TABLET ORAL DAILY
Status: DISCONTINUED | OUTPATIENT
Start: 2025-09-01 | End: 2025-09-01 | Stop reason: HOSPADM

## 2025-09-01 RX ORDER — OLANZAPINE 5 MG/1
10 TABLET, ORALLY DISINTEGRATING ORAL ONCE
Status: DISCONTINUED | OUTPATIENT
Start: 2025-09-01 | End: 2025-09-01 | Stop reason: HOSPADM

## 2025-09-03 LAB
OHS QRS DURATION: 90 MS
OHS QTC CALCULATION: 457 MS